# Patient Record
Sex: FEMALE | Race: WHITE | Employment: OTHER | URBAN - METROPOLITAN AREA
[De-identification: names, ages, dates, MRNs, and addresses within clinical notes are randomized per-mention and may not be internally consistent; named-entity substitution may affect disease eponyms.]

---

## 2017-11-24 ENCOUNTER — HOSPITAL ENCOUNTER (OUTPATIENT)
Dept: WOMENS IMAGING | Age: 77
Discharge: HOME | End: 2017-11-24
Payer: MEDICARE

## 2017-11-24 DIAGNOSIS — Z12.39 ENCOUNTER FOR SCREENING BREAST EXAMINATION: ICD-10-CM

## 2017-11-24 PROCEDURE — G0202 SCR MAMMO BI INCL CAD: HCPCS

## 2022-11-08 ENCOUNTER — HOSPITAL ENCOUNTER (OUTPATIENT)
Dept: WOMENS IMAGING | Age: 82
Discharge: HOME | End: 2022-11-10
Payer: MEDICARE

## 2022-11-08 VITALS — HEIGHT: 64 IN

## 2022-11-08 DIAGNOSIS — Z12.31 ENCOUNTER FOR SCREENING MAMMOGRAM FOR BREAST CANCER: ICD-10-CM

## 2022-11-08 PROCEDURE — 77067 SCR MAMMO BI INCL CAD: CPT

## 2023-03-24 LAB
ALANINE AMINOTRANSFERASE (SGPT) (U/L) IN SER/PLAS: 13 U/L (ref 7–45)
ALBUMIN (G/DL) IN SER/PLAS: 4.4 G/DL (ref 3.4–5)
ALKALINE PHOSPHATASE (U/L) IN SER/PLAS: 59 U/L (ref 33–136)
ANION GAP IN SER/PLAS: 14 MMOL/L (ref 10–20)
ASPARTATE AMINOTRANSFERASE (SGOT) (U/L) IN SER/PLAS: 21 U/L (ref 9–39)
BASOPHILS (10*3/UL) IN BLOOD BY AUTOMATED COUNT: 0.06 X10E9/L (ref 0–0.1)
BASOPHILS/100 LEUKOCYTES IN BLOOD BY AUTOMATED COUNT: 0.9 % (ref 0–2)
BILIRUBIN TOTAL (MG/DL) IN SER/PLAS: 0.6 MG/DL (ref 0–1.2)
CALCIUM (MG/DL) IN SER/PLAS: 9.9 MG/DL (ref 8.6–10.3)
CARBON DIOXIDE, TOTAL (MMOL/L) IN SER/PLAS: 30 MMOL/L (ref 21–32)
CHLORIDE (MMOL/L) IN SER/PLAS: 102 MMOL/L (ref 98–107)
CHOLESTEROL (MG/DL) IN SER/PLAS: 159 MG/DL (ref 0–199)
CHOLESTEROL IN HDL (MG/DL) IN SER/PLAS: 58.5 MG/DL
CHOLESTEROL/HDL RATIO: 2.7
CREATININE (MG/DL) IN SER/PLAS: 1.09 MG/DL (ref 0.5–1.05)
EOSINOPHILS (10*3/UL) IN BLOOD BY AUTOMATED COUNT: 0.22 X10E9/L (ref 0–0.4)
EOSINOPHILS/100 LEUKOCYTES IN BLOOD BY AUTOMATED COUNT: 3.3 % (ref 0–6)
ERYTHROCYTE DISTRIBUTION WIDTH (RATIO) BY AUTOMATED COUNT: 14.5 % (ref 11.5–14.5)
ERYTHROCYTE MEAN CORPUSCULAR HEMOGLOBIN CONCENTRATION (G/DL) BY AUTOMATED: 31.2 G/DL (ref 32–36)
ERYTHROCYTE MEAN CORPUSCULAR VOLUME (FL) BY AUTOMATED COUNT: 86 FL (ref 80–100)
ERYTHROCYTES (10*6/UL) IN BLOOD BY AUTOMATED COUNT: 5.06 X10E12/L (ref 4–5.2)
GFR FEMALE: 50 ML/MIN/1.73M2
GLUCOSE (MG/DL) IN SER/PLAS: 102 MG/DL (ref 74–99)
HEMATOCRIT (%) IN BLOOD BY AUTOMATED COUNT: 43.3 % (ref 36–46)
HEMOGLOBIN (G/DL) IN BLOOD: 13.5 G/DL (ref 12–16)
IMMATURE GRANULOCYTES/100 LEUKOCYTES IN BLOOD BY AUTOMATED COUNT: 0.1 % (ref 0–0.9)
LDL: 84 MG/DL (ref 0–99)
LEUKOCYTES (10*3/UL) IN BLOOD BY AUTOMATED COUNT: 6.7 X10E9/L (ref 4.4–11.3)
LYMPHOCYTES (10*3/UL) IN BLOOD BY AUTOMATED COUNT: 1.48 X10E9/L (ref 0.8–3)
LYMPHOCYTES/100 LEUKOCYTES IN BLOOD BY AUTOMATED COUNT: 22 % (ref 13–44)
MONOCYTES (10*3/UL) IN BLOOD BY AUTOMATED COUNT: 0.56 X10E9/L (ref 0.05–0.8)
MONOCYTES/100 LEUKOCYTES IN BLOOD BY AUTOMATED COUNT: 8.3 % (ref 2–10)
NEUTROPHILS (10*3/UL) IN BLOOD BY AUTOMATED COUNT: 4.4 X10E9/L (ref 1.6–5.5)
NEUTROPHILS/100 LEUKOCYTES IN BLOOD BY AUTOMATED COUNT: 65.4 % (ref 40–80)
PLATELETS (10*3/UL) IN BLOOD AUTOMATED COUNT: 194 X10E9/L (ref 150–450)
POTASSIUM (MMOL/L) IN SER/PLAS: 3.5 MMOL/L (ref 3.5–5.3)
PROTEIN TOTAL: 7.3 G/DL (ref 6.4–8.2)
SODIUM (MMOL/L) IN SER/PLAS: 142 MMOL/L (ref 136–145)
TRIGLYCERIDE (MG/DL) IN SER/PLAS: 83 MG/DL (ref 0–149)
UREA NITROGEN (MG/DL) IN SER/PLAS: 22 MG/DL (ref 6–23)
VLDL: 17 MG/DL (ref 0–40)

## 2023-04-01 LAB — URINE CULTURE: ABNORMAL

## 2023-05-16 LAB
BASOPHILS (10*3/UL) IN BLOOD BY AUTOMATED COUNT: 0.05 X10E9/L (ref 0–0.1)
BASOPHILS/100 LEUKOCYTES IN BLOOD BY AUTOMATED COUNT: 0.8 % (ref 0–2)
EOSINOPHILS (10*3/UL) IN BLOOD BY AUTOMATED COUNT: 0.25 X10E9/L (ref 0–0.4)
EOSINOPHILS/100 LEUKOCYTES IN BLOOD BY AUTOMATED COUNT: 3.8 % (ref 0–6)
ERYTHROCYTE DISTRIBUTION WIDTH (RATIO) BY AUTOMATED COUNT: 14.3 % (ref 11.5–14.5)
ERYTHROCYTE MEAN CORPUSCULAR HEMOGLOBIN CONCENTRATION (G/DL) BY AUTOMATED: 30.6 G/DL (ref 32–36)
ERYTHROCYTE MEAN CORPUSCULAR VOLUME (FL) BY AUTOMATED COUNT: 88 FL (ref 80–100)
ERYTHROCYTES (10*6/UL) IN BLOOD BY AUTOMATED COUNT: 4.85 X10E12/L (ref 4–5.2)
HEMATOCRIT (%) IN BLOOD BY AUTOMATED COUNT: 42.8 % (ref 36–46)
HEMOGLOBIN (G/DL) IN BLOOD: 13.1 G/DL (ref 12–16)
IMMATURE GRANULOCYTES/100 LEUKOCYTES IN BLOOD BY AUTOMATED COUNT: 0.2 % (ref 0–0.9)
LEUKOCYTES (10*3/UL) IN BLOOD BY AUTOMATED COUNT: 6.6 X10E9/L (ref 4.4–11.3)
LYMPHOCYTES (10*3/UL) IN BLOOD BY AUTOMATED COUNT: 1.46 X10E9/L (ref 0.8–3)
LYMPHOCYTES/100 LEUKOCYTES IN BLOOD BY AUTOMATED COUNT: 22.1 % (ref 13–44)
MONOCYTES (10*3/UL) IN BLOOD BY AUTOMATED COUNT: 0.49 X10E9/L (ref 0.05–0.8)
MONOCYTES/100 LEUKOCYTES IN BLOOD BY AUTOMATED COUNT: 7.4 % (ref 2–10)
NEUTROPHILS (10*3/UL) IN BLOOD BY AUTOMATED COUNT: 4.35 X10E9/L (ref 1.6–5.5)
NEUTROPHILS/100 LEUKOCYTES IN BLOOD BY AUTOMATED COUNT: 65.7 % (ref 40–80)
PLATELETS (10*3/UL) IN BLOOD AUTOMATED COUNT: 189 X10E9/L (ref 150–450)

## 2023-07-21 LAB
ALANINE AMINOTRANSFERASE (SGPT) (U/L) IN SER/PLAS: 16 U/L (ref 7–45)
ALBUMIN (G/DL) IN SER/PLAS: 4.4 G/DL (ref 3.4–5)
ALKALINE PHOSPHATASE (U/L) IN SER/PLAS: 65 U/L (ref 33–136)
ANION GAP IN SER/PLAS: 12 MMOL/L (ref 10–20)
ASPARTATE AMINOTRANSFERASE (SGOT) (U/L) IN SER/PLAS: 24 U/L (ref 9–39)
BASOPHILS (10*3/UL) IN BLOOD BY AUTOMATED COUNT: 0.03 X10E9/L (ref 0–0.1)
BASOPHILS/100 LEUKOCYTES IN BLOOD BY AUTOMATED COUNT: 0.5 % (ref 0–2)
BILIRUBIN TOTAL (MG/DL) IN SER/PLAS: 0.7 MG/DL (ref 0–1.2)
CALCIUM (MG/DL) IN SER/PLAS: 9.8 MG/DL (ref 8.6–10.3)
CARBON DIOXIDE, TOTAL (MMOL/L) IN SER/PLAS: 29 MMOL/L (ref 21–32)
CHLORIDE (MMOL/L) IN SER/PLAS: 104 MMOL/L (ref 98–107)
CHOLESTEROL (MG/DL) IN SER/PLAS: 148 MG/DL (ref 0–199)
CHOLESTEROL IN HDL (MG/DL) IN SER/PLAS: 54.5 MG/DL
CHOLESTEROL/HDL RATIO: 2.7
CREATININE (MG/DL) IN SER/PLAS: 0.98 MG/DL (ref 0.5–1.05)
EOSINOPHILS (10*3/UL) IN BLOOD BY AUTOMATED COUNT: 0.15 X10E9/L (ref 0–0.4)
EOSINOPHILS/100 LEUKOCYTES IN BLOOD BY AUTOMATED COUNT: 2.6 % (ref 0–6)
ERYTHROCYTE DISTRIBUTION WIDTH (RATIO) BY AUTOMATED COUNT: 14.8 % (ref 11.5–14.5)
ERYTHROCYTE MEAN CORPUSCULAR HEMOGLOBIN CONCENTRATION (G/DL) BY AUTOMATED: 31.6 G/DL (ref 32–36)
ERYTHROCYTE MEAN CORPUSCULAR VOLUME (FL) BY AUTOMATED COUNT: 85 FL (ref 80–100)
ERYTHROCYTES (10*6/UL) IN BLOOD BY AUTOMATED COUNT: 4.94 X10E12/L (ref 4–5.2)
GFR FEMALE: 57 ML/MIN/1.73M2
GLUCOSE (MG/DL) IN SER/PLAS: 90 MG/DL (ref 74–99)
HEMATOCRIT (%) IN BLOOD BY AUTOMATED COUNT: 42.1 % (ref 36–46)
HEMOGLOBIN (G/DL) IN BLOOD: 13.3 G/DL (ref 12–16)
IMMATURE GRANULOCYTES/100 LEUKOCYTES IN BLOOD BY AUTOMATED COUNT: 0.3 % (ref 0–0.9)
LDL: 77 MG/DL (ref 0–99)
LEUKOCYTES (10*3/UL) IN BLOOD BY AUTOMATED COUNT: 5.9 X10E9/L (ref 4.4–11.3)
LYMPHOCYTES (10*3/UL) IN BLOOD BY AUTOMATED COUNT: 1.23 X10E9/L (ref 0.8–3)
LYMPHOCYTES/100 LEUKOCYTES IN BLOOD BY AUTOMATED COUNT: 21 % (ref 13–44)
MONOCYTES (10*3/UL) IN BLOOD BY AUTOMATED COUNT: 0.35 X10E9/L (ref 0.05–0.8)
MONOCYTES/100 LEUKOCYTES IN BLOOD BY AUTOMATED COUNT: 6 % (ref 2–10)
NEUTROPHILS (10*3/UL) IN BLOOD BY AUTOMATED COUNT: 4.08 X10E9/L (ref 1.6–5.5)
NEUTROPHILS/100 LEUKOCYTES IN BLOOD BY AUTOMATED COUNT: 69.6 % (ref 40–80)
PARATHYRIN INTACT (PG/ML) IN SER/PLAS: 88.4 PG/ML (ref 18.5–88)
PHOSPHATE (MG/DL) IN SER/PLAS: 3.1 MG/DL (ref 2.5–4.9)
PLATELETS (10*3/UL) IN BLOOD AUTOMATED COUNT: 175 X10E9/L (ref 150–450)
POTASSIUM (MMOL/L) IN SER/PLAS: 3.6 MMOL/L (ref 3.5–5.3)
PROTEIN TOTAL: 7.3 G/DL (ref 6.4–8.2)
SODIUM (MMOL/L) IN SER/PLAS: 141 MMOL/L (ref 136–145)
TRIGLYCERIDE (MG/DL) IN SER/PLAS: 84 MG/DL (ref 0–149)
UREA NITROGEN (MG/DL) IN SER/PLAS: 17 MG/DL (ref 6–23)
VLDL: 17 MG/DL (ref 0–40)

## 2023-10-25 ENCOUNTER — LAB (OUTPATIENT)
Dept: LAB | Facility: LAB | Age: 83
End: 2023-10-25
Payer: MEDICARE

## 2023-10-25 DIAGNOSIS — I12.9 HYPERTENSIVE CHRONIC KIDNEY DISEASE WITH STAGE 1 THROUGH STAGE 4 CHRONIC KIDNEY DISEASE, OR UNSPECIFIED CHRONIC KIDNEY DISEASE: ICD-10-CM

## 2023-10-25 DIAGNOSIS — N18.31 CHRONIC KIDNEY DISEASE, STAGE 3A (MULTI): Primary | ICD-10-CM

## 2023-10-25 DIAGNOSIS — E78.49 OTHER HYPERLIPIDEMIA: ICD-10-CM

## 2023-10-25 DIAGNOSIS — N18.31 CHRONIC KIDNEY DISEASE, STAGE 3A (MULTI): ICD-10-CM

## 2023-10-25 LAB
ALBUMIN SERPL BCP-MCNC: 4 G/DL (ref 3.4–5)
ALP SERPL-CCNC: 76 U/L (ref 33–136)
ALT SERPL W P-5'-P-CCNC: 15 U/L (ref 7–45)
ANION GAP SERPL CALC-SCNC: 11 MMOL/L (ref 10–20)
AST SERPL W P-5'-P-CCNC: 19 U/L (ref 9–39)
BASOPHILS # BLD AUTO: 0.04 X10*3/UL (ref 0–0.1)
BASOPHILS NFR BLD AUTO: 0.5 %
BILIRUB SERPL-MCNC: 0.6 MG/DL (ref 0–1.2)
BUN SERPL-MCNC: 19 MG/DL (ref 6–23)
CALCIUM SERPL-MCNC: 9.6 MG/DL (ref 8.6–10.3)
CHLORIDE SERPL-SCNC: 108 MMOL/L (ref 98–107)
CHOLEST SERPL-MCNC: 151 MG/DL (ref 0–199)
CHOLESTEROL/HDL RATIO: 2.8
CO2 SERPL-SCNC: 29 MMOL/L (ref 21–32)
CREAT SERPL-MCNC: 0.96 MG/DL (ref 0.5–1.05)
EOSINOPHIL # BLD AUTO: 0.18 X10*3/UL (ref 0–0.4)
EOSINOPHIL NFR BLD AUTO: 2.4 %
ERYTHROCYTE [DISTWIDTH] IN BLOOD BY AUTOMATED COUNT: 14.5 % (ref 11.5–14.5)
GFR SERPL CREATININE-BSD FRML MDRD: 59 ML/MIN/1.73M*2
GLUCOSE SERPL-MCNC: 75 MG/DL (ref 74–99)
HCT VFR BLD AUTO: 40 % (ref 36–46)
HDLC SERPL-MCNC: 54.1 MG/DL
HGB BLD-MCNC: 12.5 G/DL (ref 12–16)
IMM GRANULOCYTES # BLD AUTO: 0.02 X10*3/UL (ref 0–0.5)
IMM GRANULOCYTES NFR BLD AUTO: 0.3 % (ref 0–0.9)
LDLC SERPL CALC-MCNC: 81 MG/DL
LYMPHOCYTES # BLD AUTO: 1.21 X10*3/UL (ref 0.8–3)
LYMPHOCYTES NFR BLD AUTO: 16.2 %
MCH RBC QN AUTO: 27.2 PG (ref 26–34)
MCHC RBC AUTO-ENTMCNC: 31.3 G/DL (ref 32–36)
MCV RBC AUTO: 87 FL (ref 80–100)
MONOCYTES # BLD AUTO: 0.5 X10*3/UL (ref 0.05–0.8)
MONOCYTES NFR BLD AUTO: 6.7 %
NEUTROPHILS # BLD AUTO: 5.51 X10*3/UL (ref 1.6–5.5)
NEUTROPHILS NFR BLD AUTO: 73.9 %
NON HDL CHOLESTEROL: 97 MG/DL (ref 0–149)
NRBC BLD-RTO: 0 /100 WBCS (ref 0–0)
PHOSPHATE SERPL-MCNC: 3.6 MG/DL (ref 2.5–4.9)
PLATELET # BLD AUTO: 176 X10*3/UL (ref 150–450)
PMV BLD AUTO: 11 FL (ref 7.5–11.5)
POTASSIUM SERPL-SCNC: 3.9 MMOL/L (ref 3.5–5.3)
PROT SERPL-MCNC: 6.5 G/DL (ref 6.4–8.2)
PTH-INTACT SERPL-MCNC: 86.5 PG/ML (ref 18.5–88)
RBC # BLD AUTO: 4.59 X10*6/UL (ref 4–5.2)
SODIUM SERPL-SCNC: 144 MMOL/L (ref 136–145)
TRIGL SERPL-MCNC: 80 MG/DL (ref 0–149)
VLDL: 16 MG/DL (ref 0–40)
WBC # BLD AUTO: 7.5 X10*3/UL (ref 4.4–11.3)

## 2023-10-25 PROCEDURE — 80061 LIPID PANEL: CPT

## 2023-10-25 PROCEDURE — 85025 COMPLETE CBC W/AUTO DIFF WBC: CPT

## 2023-10-25 PROCEDURE — 83970 ASSAY OF PARATHORMONE: CPT

## 2023-10-25 PROCEDURE — 80053 COMPREHEN METABOLIC PANEL: CPT

## 2023-10-25 PROCEDURE — 36415 COLL VENOUS BLD VENIPUNCTURE: CPT

## 2023-10-25 PROCEDURE — 84100 ASSAY OF PHOSPHORUS: CPT

## 2024-03-01 ENCOUNTER — LAB (OUTPATIENT)
Dept: LAB | Facility: LAB | Age: 84
End: 2024-03-01
Payer: MEDICARE

## 2024-03-01 DIAGNOSIS — N18.31 CHRONIC KIDNEY DISEASE, STAGE 3A (MULTI): Primary | ICD-10-CM

## 2024-03-01 DIAGNOSIS — I12.9 HYPERTENSIVE CHRONIC KIDNEY DISEASE WITH STAGE 1 THROUGH STAGE 4 CHRONIC KIDNEY DISEASE, OR UNSPECIFIED CHRONIC KIDNEY DISEASE: ICD-10-CM

## 2024-03-01 DIAGNOSIS — E78.49 OTHER HYPERLIPIDEMIA: ICD-10-CM

## 2024-03-01 LAB
ALBUMIN SERPL BCP-MCNC: 4.2 G/DL (ref 3.4–5)
ALP SERPL-CCNC: 86 U/L (ref 33–136)
ALT SERPL W P-5'-P-CCNC: 13 U/L (ref 7–45)
ANION GAP SERPL CALC-SCNC: 13 MMOL/L (ref 10–20)
AST SERPL W P-5'-P-CCNC: 20 U/L (ref 9–39)
BASOPHILS # BLD AUTO: 0.03 X10*3/UL (ref 0–0.1)
BASOPHILS NFR BLD AUTO: 0.5 %
BILIRUB SERPL-MCNC: 0.7 MG/DL (ref 0–1.2)
BUN SERPL-MCNC: 20 MG/DL (ref 6–23)
CALCIUM SERPL-MCNC: 9.8 MG/DL (ref 8.6–10.3)
CHLORIDE SERPL-SCNC: 106 MMOL/L (ref 98–107)
CHOLEST SERPL-MCNC: 147 MG/DL (ref 0–199)
CHOLESTEROL/HDL RATIO: 2.9
CO2 SERPL-SCNC: 27 MMOL/L (ref 21–32)
CREAT SERPL-MCNC: 1.04 MG/DL (ref 0.5–1.05)
EGFRCR SERPLBLD CKD-EPI 2021: 53 ML/MIN/1.73M*2
EOSINOPHIL # BLD AUTO: 0.17 X10*3/UL (ref 0–0.4)
EOSINOPHIL NFR BLD AUTO: 2.8 %
ERYTHROCYTE [DISTWIDTH] IN BLOOD BY AUTOMATED COUNT: 14.6 % (ref 11.5–14.5)
GLUCOSE SERPL-MCNC: 93 MG/DL (ref 74–99)
HCT VFR BLD AUTO: 42.3 % (ref 36–46)
HDLC SERPL-MCNC: 50.8 MG/DL
HGB BLD-MCNC: 13.5 G/DL (ref 12–16)
IMM GRANULOCYTES # BLD AUTO: 0.01 X10*3/UL (ref 0–0.5)
IMM GRANULOCYTES NFR BLD AUTO: 0.2 % (ref 0–0.9)
LDLC SERPL CALC-MCNC: 80 MG/DL
LYMPHOCYTES # BLD AUTO: 1.31 X10*3/UL (ref 0.8–3)
LYMPHOCYTES NFR BLD AUTO: 21.2 %
MCH RBC QN AUTO: 27.3 PG (ref 26–34)
MCHC RBC AUTO-ENTMCNC: 31.9 G/DL (ref 32–36)
MCV RBC AUTO: 86 FL (ref 80–100)
MONOCYTES # BLD AUTO: 0.42 X10*3/UL (ref 0.05–0.8)
MONOCYTES NFR BLD AUTO: 6.8 %
NEUTROPHILS # BLD AUTO: 4.23 X10*3/UL (ref 1.6–5.5)
NEUTROPHILS NFR BLD AUTO: 68.5 %
NON HDL CHOLESTEROL: 96 MG/DL (ref 0–149)
NRBC BLD-RTO: 0 /100 WBCS (ref 0–0)
PHOSPHATE SERPL-MCNC: 3.4 MG/DL (ref 2.5–4.9)
PLATELET # BLD AUTO: 173 X10*3/UL (ref 150–450)
POTASSIUM SERPL-SCNC: 4 MMOL/L (ref 3.5–5.3)
PROT SERPL-MCNC: 7.1 G/DL (ref 6.4–8.2)
PTH-INTACT SERPL-MCNC: 97.4 PG/ML (ref 18.5–88)
RBC # BLD AUTO: 4.94 X10*6/UL (ref 4–5.2)
SODIUM SERPL-SCNC: 142 MMOL/L (ref 136–145)
TRIGL SERPL-MCNC: 82 MG/DL (ref 0–149)
VLDL: 16 MG/DL (ref 0–40)
WBC # BLD AUTO: 6.2 X10*3/UL (ref 4.4–11.3)

## 2024-03-01 PROCEDURE — 80053 COMPREHEN METABOLIC PANEL: CPT

## 2024-03-01 PROCEDURE — 83970 ASSAY OF PARATHORMONE: CPT

## 2024-03-01 PROCEDURE — 80061 LIPID PANEL: CPT

## 2024-03-01 PROCEDURE — 36415 COLL VENOUS BLD VENIPUNCTURE: CPT

## 2024-03-01 PROCEDURE — 84100 ASSAY OF PHOSPHORUS: CPT

## 2024-03-01 PROCEDURE — 85025 COMPLETE CBC W/AUTO DIFF WBC: CPT

## 2024-03-11 PROCEDURE — 87086 URINE CULTURE/COLONY COUNT: CPT

## 2024-03-11 PROCEDURE — 87186 SC STD MICRODIL/AGAR DIL: CPT

## 2024-03-12 ENCOUNTER — LAB REQUISITION (OUTPATIENT)
Dept: LAB | Facility: HOSPITAL | Age: 84
End: 2024-03-12
Payer: COMMERCIAL

## 2024-03-12 DIAGNOSIS — R82.998 OTHER ABNORMAL FINDINGS IN URINE: ICD-10-CM

## 2024-03-14 LAB — BACTERIA UR CULT: ABNORMAL

## 2024-04-05 ENCOUNTER — APPOINTMENT (OUTPATIENT)
Dept: RADIOLOGY | Facility: HOSPITAL | Age: 84
End: 2024-04-05
Payer: MEDICARE

## 2024-04-05 ENCOUNTER — HOSPITAL ENCOUNTER (EMERGENCY)
Facility: HOSPITAL | Age: 84
Discharge: OTHER INSTITUTION | End: 2024-04-06
Attending: STUDENT IN AN ORGANIZED HEALTH CARE EDUCATION/TRAINING PROGRAM
Payer: MEDICARE

## 2024-04-05 DIAGNOSIS — R42 LIGHT HEADED: Primary | ICD-10-CM

## 2024-04-05 DIAGNOSIS — R07.9 CHEST PAIN, UNSPECIFIED TYPE: ICD-10-CM

## 2024-04-05 LAB — GLUCOSE BLD MANUAL STRIP-MCNC: 166 MG/DL (ref 74–99)

## 2024-04-05 PROCEDURE — 83735 ASSAY OF MAGNESIUM: CPT | Performed by: STUDENT IN AN ORGANIZED HEALTH CARE EDUCATION/TRAINING PROGRAM

## 2024-04-05 PROCEDURE — 99291 CRITICAL CARE FIRST HOUR: CPT

## 2024-04-05 PROCEDURE — 93005 ELECTROCARDIOGRAM TRACING: CPT

## 2024-04-05 PROCEDURE — 2500000004 HC RX 250 GENERAL PHARMACY W/ HCPCS (ALT 636 FOR OP/ED)

## 2024-04-05 PROCEDURE — 70450 CT HEAD/BRAIN W/O DYE: CPT | Mod: 59

## 2024-04-05 PROCEDURE — 70498 CT ANGIOGRAPHY NECK: CPT

## 2024-04-05 PROCEDURE — 85025 COMPLETE CBC W/AUTO DIFF WBC: CPT | Performed by: STUDENT IN AN ORGANIZED HEALTH CARE EDUCATION/TRAINING PROGRAM

## 2024-04-05 PROCEDURE — 84484 ASSAY OF TROPONIN QUANT: CPT | Performed by: STUDENT IN AN ORGANIZED HEALTH CARE EDUCATION/TRAINING PROGRAM

## 2024-04-05 PROCEDURE — 80053 COMPREHEN METABOLIC PANEL: CPT | Performed by: STUDENT IN AN ORGANIZED HEALTH CARE EDUCATION/TRAINING PROGRAM

## 2024-04-05 PROCEDURE — 85610 PROTHROMBIN TIME: CPT | Performed by: STUDENT IN AN ORGANIZED HEALTH CARE EDUCATION/TRAINING PROGRAM

## 2024-04-05 PROCEDURE — 99291 CRITICAL CARE FIRST HOUR: CPT | Performed by: STUDENT IN AN ORGANIZED HEALTH CARE EDUCATION/TRAINING PROGRAM

## 2024-04-05 PROCEDURE — 85730 THROMBOPLASTIN TIME PARTIAL: CPT | Performed by: STUDENT IN AN ORGANIZED HEALTH CARE EDUCATION/TRAINING PROGRAM

## 2024-04-05 PROCEDURE — 96374 THER/PROPH/DIAG INJ IV PUSH: CPT

## 2024-04-05 PROCEDURE — 83880 ASSAY OF NATRIURETIC PEPTIDE: CPT | Performed by: STUDENT IN AN ORGANIZED HEALTH CARE EDUCATION/TRAINING PROGRAM

## 2024-04-05 PROCEDURE — 71045 X-RAY EXAM CHEST 1 VIEW: CPT

## 2024-04-05 PROCEDURE — 82947 ASSAY GLUCOSE BLOOD QUANT: CPT | Mod: 59

## 2024-04-05 PROCEDURE — 36415 COLL VENOUS BLD VENIPUNCTURE: CPT | Performed by: STUDENT IN AN ORGANIZED HEALTH CARE EDUCATION/TRAINING PROGRAM

## 2024-04-05 RX ORDER — ONDANSETRON HYDROCHLORIDE 2 MG/ML
4 INJECTION, SOLUTION INTRAVENOUS ONCE
Status: COMPLETED | OUTPATIENT
Start: 2024-04-05 | End: 2024-04-05

## 2024-04-05 RX ORDER — ONDANSETRON HYDROCHLORIDE 2 MG/ML
INJECTION, SOLUTION INTRAVENOUS
Status: COMPLETED
Start: 2024-04-05 | End: 2024-04-05

## 2024-04-05 RX ADMIN — ONDANSETRON HYDROCHLORIDE 4 MG: 2 INJECTION, SOLUTION INTRAVENOUS at 23:50

## 2024-04-05 RX ADMIN — ONDANSETRON 4 MG: 2 INJECTION INTRAMUSCULAR; INTRAVENOUS at 23:50

## 2024-04-05 ASSESSMENT — COLUMBIA-SUICIDE SEVERITY RATING SCALE - C-SSRS
1. IN THE PAST MONTH, HAVE YOU WISHED YOU WERE DEAD OR WISHED YOU COULD GO TO SLEEP AND NOT WAKE UP?: NO
6. HAVE YOU EVER DONE ANYTHING, STARTED TO DO ANYTHING, OR PREPARED TO DO ANYTHING TO END YOUR LIFE?: NO
2. HAVE YOU ACTUALLY HAD ANY THOUGHTS OF KILLING YOURSELF?: NO

## 2024-04-06 ENCOUNTER — APPOINTMENT (OUTPATIENT)
Dept: RADIOLOGY | Facility: HOSPITAL | Age: 84
End: 2024-04-06
Payer: MEDICARE

## 2024-04-06 ENCOUNTER — APPOINTMENT (OUTPATIENT)
Dept: CARDIOLOGY | Facility: HOSPITAL | Age: 84
DRG: 300 | End: 2024-04-06
Payer: MEDICARE

## 2024-04-06 ENCOUNTER — APPOINTMENT (OUTPATIENT)
Dept: RADIOLOGY | Facility: HOSPITAL | Age: 84
DRG: 300 | End: 2024-04-06
Payer: MEDICARE

## 2024-04-06 ENCOUNTER — HOSPITAL ENCOUNTER (INPATIENT)
Facility: HOSPITAL | Age: 84
LOS: 4 days | Discharge: HOME | DRG: 300 | End: 2024-04-10
Attending: THORACIC SURGERY (CARDIOTHORACIC VASCULAR SURGERY) | Admitting: INTERNAL MEDICINE
Payer: MEDICARE

## 2024-04-06 ENCOUNTER — APPOINTMENT (OUTPATIENT)
Dept: CARDIOLOGY | Facility: HOSPITAL | Age: 84
End: 2024-04-06
Payer: MEDICARE

## 2024-04-06 VITALS
HEIGHT: 64 IN | SYSTOLIC BLOOD PRESSURE: 137 MMHG | RESPIRATION RATE: 18 BRPM | OXYGEN SATURATION: 97 % | WEIGHT: 140 LBS | BODY MASS INDEX: 23.9 KG/M2 | DIASTOLIC BLOOD PRESSURE: 62 MMHG | TEMPERATURE: 96.6 F | HEART RATE: 60 BPM

## 2024-04-06 DIAGNOSIS — I71.00 AORTIC DISSECTION (MULTI): Primary | ICD-10-CM

## 2024-04-06 LAB
ABO GROUP (TYPE) IN BLOOD: NORMAL
ALBUMIN SERPL BCP-MCNC: 4.1 G/DL (ref 3.4–5)
ALBUMIN SERPL BCP-MCNC: 4.2 G/DL (ref 3.4–5)
ALP SERPL-CCNC: 104 U/L (ref 33–136)
ALP SERPL-CCNC: 96 U/L (ref 33–136)
ALT SERPL W P-5'-P-CCNC: 13 U/L (ref 7–45)
ALT SERPL W P-5'-P-CCNC: 14 U/L (ref 7–45)
ANION GAP BLDA CALCULATED.4IONS-SCNC: 10 MMO/L (ref 10–25)
ANION GAP SERPL CALC-SCNC: 16 MMOL/L (ref 10–20)
ANION GAP SERPL CALC-SCNC: 16 MMOL/L (ref 10–20)
ANTIBODY SCREEN: NORMAL
AORTIC VALVE MEAN GRADIENT: 5 MMHG
AORTIC VALVE PEAK VELOCITY: 1.46 M/S
APPEARANCE UR: CLEAR
APTT PPP: 27 SECONDS (ref 27–38)
APTT PPP: 27 SECONDS (ref 27–38)
AST SERPL W P-5'-P-CCNC: 19 U/L (ref 9–39)
AST SERPL W P-5'-P-CCNC: 20 U/L (ref 9–39)
ATRIAL RATE: 72 BPM
ATRIAL RATE: 86 BPM
AV PEAK GRADIENT: 8.5 MMHG
AVA (PEAK VEL): 2.14 CM2
AVA (VTI): 2.17 CM2
BASE EXCESS BLDA CALC-SCNC: -0.9 MMOL/L (ref -2–3)
BASOPHILS # BLD AUTO: 0.03 X10*3/UL (ref 0–0.1)
BASOPHILS NFR BLD AUTO: 0.4 %
BILIRUB SERPL-MCNC: 0.6 MG/DL (ref 0–1.2)
BILIRUB SERPL-MCNC: 0.6 MG/DL (ref 0–1.2)
BILIRUB UR STRIP.AUTO-MCNC: NEGATIVE MG/DL
BNP SERPL-MCNC: 115 PG/ML (ref 0–99)
BNP SERPL-MCNC: 63 PG/ML (ref 0–99)
BODY SURFACE AREA: 1.69 M2
BODY TEMPERATURE: 37 DEGREES CELSIUS
BUN SERPL-MCNC: 18 MG/DL (ref 6–23)
BUN SERPL-MCNC: 22 MG/DL (ref 6–23)
CA-I BLD-SCNC: 1.11 MMOL/L (ref 1.1–1.33)
CA-I BLDA-SCNC: 1.17 MMOL/L (ref 1.1–1.33)
CALCIUM SERPL-MCNC: 9 MG/DL (ref 8.6–10.6)
CALCIUM SERPL-MCNC: 9.5 MG/DL (ref 8.6–10.3)
CARDIAC TROPONIN I PNL SERPL HS: 4 NG/L (ref 0–13)
CARDIAC TROPONIN I PNL SERPL HS: 6 NG/L (ref 0–34)
CARDIAC TROPONIN I PNL SERPL HS: 8 NG/L (ref 0–13)
CHLORIDE BLDA-SCNC: 109 MMOL/L (ref 98–107)
CHLORIDE SERPL-SCNC: 105 MMOL/L (ref 98–107)
CHLORIDE SERPL-SCNC: 106 MMOL/L (ref 98–107)
CO2 SERPL-SCNC: 20 MMOL/L (ref 21–32)
CO2 SERPL-SCNC: 21 MMOL/L (ref 21–32)
COLOR UR: ABNORMAL
CREAT SERPL-MCNC: 0.88 MG/DL (ref 0.5–1.05)
CREAT SERPL-MCNC: 1.01 MG/DL (ref 0.5–1.05)
EGFRCR SERPLBLD CKD-EPI 2021: 55 ML/MIN/1.73M*2
EGFRCR SERPLBLD CKD-EPI 2021: 65 ML/MIN/1.73M*2
EJECTION FRACTION APICAL 4 CHAMBER: 73.9
EOSINOPHIL # BLD AUTO: 0.13 X10*3/UL (ref 0–0.4)
EOSINOPHIL NFR BLD AUTO: 1.9 %
ERYTHROCYTE [DISTWIDTH] IN BLOOD BY AUTOMATED COUNT: 14.3 % (ref 11.5–14.5)
ERYTHROCYTE [DISTWIDTH] IN BLOOD BY AUTOMATED COUNT: 14.3 % (ref 11.5–14.5)
FLUAV RNA RESP QL NAA+PROBE: NOT DETECTED
FLUBV RNA RESP QL NAA+PROBE: NOT DETECTED
GLUCOSE BLDA-MCNC: 126 MG/DL (ref 74–99)
GLUCOSE SERPL-MCNC: 161 MG/DL (ref 74–99)
GLUCOSE SERPL-MCNC: 172 MG/DL (ref 74–99)
GLUCOSE UR STRIP.AUTO-MCNC: NEGATIVE MG/DL
HCO3 BLDA-SCNC: 23.5 MMOL/L (ref 22–26)
HCT VFR BLD AUTO: 38.8 % (ref 36–46)
HCT VFR BLD AUTO: 39.3 % (ref 36–46)
HCT VFR BLD EST: 34 % (ref 36–46)
HGB BLD-MCNC: 12.4 G/DL (ref 12–16)
HGB BLD-MCNC: 13.1 G/DL (ref 12–16)
HGB BLDA-MCNC: 11.4 G/DL (ref 12–16)
HOLD SPECIMEN: NORMAL
IMM GRANULOCYTES # BLD AUTO: 0.01 X10*3/UL (ref 0–0.5)
IMM GRANULOCYTES NFR BLD AUTO: 0.1 % (ref 0–0.9)
INHALED O2 CONCENTRATION: 21 %
INR PPP: 1 (ref 0.9–1.1)
INR PPP: 1 (ref 0.9–1.1)
KETONES UR STRIP.AUTO-MCNC: ABNORMAL MG/DL
LACTATE BLDA-SCNC: 0.6 MMOL/L (ref 0.4–2)
LACTATE SERPL-SCNC: 0.8 MMOL/L (ref 0.4–2)
LEFT ATRIUM VOLUME AREA LENGTH INDEX BSA: 24.7 ML/M2
LEFT VENTRICLE INTERNAL DIMENSION DIASTOLE: 4 CM (ref 3.5–6)
LEFT VENTRICULAR OUTFLOW TRACT DIAMETER: 2 CM
LEUKOCYTE ESTERASE UR QL STRIP.AUTO: NEGATIVE
LIPASE SERPL-CCNC: 25 U/L (ref 9–82)
LV EJECTION FRACTION BIPLANE: 71 %
LYMPHOCYTES # BLD AUTO: 1.66 X10*3/UL (ref 0.8–3)
LYMPHOCYTES NFR BLD AUTO: 24.2 %
MAGNESIUM SERPL-MCNC: 1.69 MG/DL (ref 1.6–2.4)
MAGNESIUM SERPL-MCNC: 1.72 MG/DL (ref 1.6–2.4)
MCH RBC QN AUTO: 27.3 PG (ref 26–34)
MCH RBC QN AUTO: 27.6 PG (ref 26–34)
MCHC RBC AUTO-ENTMCNC: 32 G/DL (ref 32–36)
MCHC RBC AUTO-ENTMCNC: 33.3 G/DL (ref 32–36)
MCV RBC AUTO: 83 FL (ref 80–100)
MCV RBC AUTO: 85 FL (ref 80–100)
MITRAL VALVE E/A RATIO: 0.7
MITRAL VALVE E/E' RATIO: 9.86
MONOCYTES # BLD AUTO: 0.42 X10*3/UL (ref 0.05–0.8)
MONOCYTES NFR BLD AUTO: 6.1 %
NEUTROPHILS # BLD AUTO: 4.61 X10*3/UL (ref 1.6–5.5)
NEUTROPHILS NFR BLD AUTO: 67.3 %
NITRITE UR QL STRIP.AUTO: NEGATIVE
NRBC BLD-RTO: 0 /100 WBCS (ref 0–0)
NRBC BLD-RTO: 0 /100 WBCS (ref 0–0)
OXYHGB MFR BLDA: 91.7 % (ref 94–98)
P AXIS: 50 DEGREES
P AXIS: 59 DEGREES
P OFFSET: 187 MS
P OFFSET: 198 MS
P ONSET: 131 MS
P ONSET: 145 MS
PCO2 BLDA: 37 MM HG (ref 38–42)
PH BLDA: 7.41 PH (ref 7.38–7.42)
PH UR STRIP.AUTO: 7 [PH]
PLATELET # BLD AUTO: 181 X10*3/UL (ref 150–450)
PLATELET # BLD AUTO: 184 X10*3/UL (ref 150–450)
PO2 BLDA: 66 MM HG (ref 85–95)
POTASSIUM BLDA-SCNC: 4.2 MMOL/L (ref 3.5–5.3)
POTASSIUM SERPL-SCNC: 3.5 MMOL/L (ref 3.5–5.3)
POTASSIUM SERPL-SCNC: 3.9 MMOL/L (ref 3.5–5.3)
PR INTERVAL: 162 MS
PR INTERVAL: 178 MS
PROT SERPL-MCNC: 6.7 G/DL (ref 6.4–8.2)
PROT SERPL-MCNC: 7 G/DL (ref 6.4–8.2)
PROT UR STRIP.AUTO-MCNC: NEGATIVE MG/DL
PROTHROMBIN TIME: 11.4 SECONDS (ref 9.8–12.8)
PROTHROMBIN TIME: 11.4 SECONDS (ref 9.8–12.8)
Q ONSET: 220 MS
Q ONSET: 226 MS
QRS COUNT: 12 BEATS
QRS COUNT: 14 BEATS
QRS DURATION: 76 MS
QRS DURATION: 86 MS
QT INTERVAL: 418 MS
QT INTERVAL: 480 MS
QTC CALCULATION(BAZETT): 500 MS
QTC CALCULATION(BAZETT): 525 MS
QTC FREDERICIA: 471 MS
QTC FREDERICIA: 510 MS
R AXIS: 20 DEGREES
R AXIS: 25 DEGREES
RBC # BLD AUTO: 4.55 X10*6/UL (ref 4–5.2)
RBC # BLD AUTO: 4.75 X10*6/UL (ref 4–5.2)
RBC # UR STRIP.AUTO: ABNORMAL /UL
RBC #/AREA URNS AUTO: NORMAL /HPF
RH FACTOR (ANTIGEN D): NORMAL
RIGHT VENTRICLE FREE WALL PEAK S': 11.7 CM/S
RIGHT VENTRICLE PEAK SYSTOLIC PRESSURE: 23.6 MMHG
SAO2 % BLDA: 95 % (ref 94–100)
SARS-COV-2 RNA RESP QL NAA+PROBE: NOT DETECTED
SODIUM BLDA-SCNC: 138 MMOL/L (ref 136–145)
SODIUM SERPL-SCNC: 137 MMOL/L (ref 136–145)
SODIUM SERPL-SCNC: 139 MMOL/L (ref 136–145)
SP GR UR STRIP.AUTO: 1.03
T AXIS: 21 DEGREES
T AXIS: 42 DEGREES
T OFFSET: 435 MS
T OFFSET: 460 MS
TRICUSPID ANNULAR PLANE SYSTOLIC EXCURSION: 2.3 CM
UROBILINOGEN UR STRIP.AUTO-MCNC: <2 MG/DL
VENTRICULAR RATE: 72 BPM
VENTRICULAR RATE: 86 BPM
WBC # BLD AUTO: 6.9 X10*3/UL (ref 4.4–11.3)
WBC # BLD AUTO: 8.6 X10*3/UL (ref 4.4–11.3)
WBC #/AREA URNS AUTO: NORMAL /HPF

## 2024-04-06 PROCEDURE — 2500000001 HC RX 250 WO HCPCS SELF ADMINISTERED DRUGS (ALT 637 FOR MEDICARE OP)

## 2024-04-06 PROCEDURE — 2500000004 HC RX 250 GENERAL PHARMACY W/ HCPCS (ALT 636 FOR OP/ED)

## 2024-04-06 PROCEDURE — 93005 ELECTROCARDIOGRAM TRACING: CPT

## 2024-04-06 PROCEDURE — 86901 BLOOD TYPING SEROLOGIC RH(D): CPT

## 2024-04-06 PROCEDURE — 1100000001 HC PRIVATE ROOM DAILY

## 2024-04-06 PROCEDURE — 70496 CT ANGIOGRAPHY HEAD: CPT | Performed by: RADIOLOGY

## 2024-04-06 PROCEDURE — 83735 ASSAY OF MAGNESIUM: CPT

## 2024-04-06 PROCEDURE — 96375 TX/PRO/DX INJ NEW DRUG ADDON: CPT

## 2024-04-06 PROCEDURE — 87636 SARSCOV2 & INF A&B AMP PRB: CPT | Performed by: STUDENT IN AN ORGANIZED HEALTH CARE EDUCATION/TRAINING PROGRAM

## 2024-04-06 PROCEDURE — 85610 PROTHROMBIN TIME: CPT

## 2024-04-06 PROCEDURE — 71045 X-RAY EXAM CHEST 1 VIEW: CPT | Performed by: RADIOLOGY

## 2024-04-06 PROCEDURE — 37799 UNLISTED PX VASCULAR SURGERY: CPT

## 2024-04-06 PROCEDURE — 2550000001 HC RX 255 CONTRASTS: Performed by: STUDENT IN AN ORGANIZED HEALTH CARE EDUCATION/TRAINING PROGRAM

## 2024-04-06 PROCEDURE — 85027 COMPLETE CBC AUTOMATED: CPT

## 2024-04-06 PROCEDURE — 99291 CRITICAL CARE FIRST HOUR: CPT

## 2024-04-06 PROCEDURE — 83690 ASSAY OF LIPASE: CPT | Performed by: STUDENT IN AN ORGANIZED HEALTH CARE EDUCATION/TRAINING PROGRAM

## 2024-04-06 PROCEDURE — 36415 COLL VENOUS BLD VENIPUNCTURE: CPT | Performed by: STUDENT IN AN ORGANIZED HEALTH CARE EDUCATION/TRAINING PROGRAM

## 2024-04-06 PROCEDURE — 93306 TTE W/DOPPLER COMPLETE: CPT

## 2024-04-06 PROCEDURE — 84484 ASSAY OF TROPONIN QUANT: CPT

## 2024-04-06 PROCEDURE — 80053 COMPREHEN METABOLIC PANEL: CPT

## 2024-04-06 PROCEDURE — 93306 TTE W/DOPPLER COMPLETE: CPT | Performed by: INTERNAL MEDICINE

## 2024-04-06 PROCEDURE — 36415 COLL VENOUS BLD VENIPUNCTURE: CPT

## 2024-04-06 PROCEDURE — 83605 ASSAY OF LACTIC ACID: CPT

## 2024-04-06 PROCEDURE — 2500000004 HC RX 250 GENERAL PHARMACY W/ HCPCS (ALT 636 FOR OP/ED): Mod: JZ

## 2024-04-06 PROCEDURE — 81001 URINALYSIS AUTO W/SCOPE: CPT | Performed by: STUDENT IN AN ORGANIZED HEALTH CARE EDUCATION/TRAINING PROGRAM

## 2024-04-06 PROCEDURE — 84132 ASSAY OF SERUM POTASSIUM: CPT

## 2024-04-06 PROCEDURE — 84484 ASSAY OF TROPONIN QUANT: CPT | Performed by: STUDENT IN AN ORGANIZED HEALTH CARE EDUCATION/TRAINING PROGRAM

## 2024-04-06 PROCEDURE — 74174 CTA ABD&PLVS W/CONTRAST: CPT | Performed by: RADIOLOGY

## 2024-04-06 PROCEDURE — 71275 CT ANGIOGRAPHY CHEST: CPT | Performed by: RADIOLOGY

## 2024-04-06 PROCEDURE — 71045 X-RAY EXAM CHEST 1 VIEW: CPT

## 2024-04-06 PROCEDURE — 71275 CT ANGIOGRAPHY CHEST: CPT

## 2024-04-06 PROCEDURE — 2500000004 HC RX 250 GENERAL PHARMACY W/ HCPCS (ALT 636 FOR OP/ED): Performed by: STUDENT IN AN ORGANIZED HEALTH CARE EDUCATION/TRAINING PROGRAM

## 2024-04-06 PROCEDURE — 82330 ASSAY OF CALCIUM: CPT

## 2024-04-06 PROCEDURE — 70498 CT ANGIOGRAPHY NECK: CPT | Performed by: RADIOLOGY

## 2024-04-06 PROCEDURE — 70450 CT HEAD/BRAIN W/O DYE: CPT | Performed by: RADIOLOGY

## 2024-04-06 PROCEDURE — 83880 ASSAY OF NATRIURETIC PEPTIDE: CPT

## 2024-04-06 RX ORDER — NICARDIPINE HYDROCHLORIDE 0.2 MG/ML
2.5-15 INJECTION INTRAVENOUS CONTINUOUS
Status: DISCONTINUED | OUTPATIENT
Start: 2024-04-06 | End: 2024-04-08

## 2024-04-06 RX ORDER — ENOXAPARIN SODIUM 100 MG/ML
40 INJECTION SUBCUTANEOUS EVERY 24 HOURS
Status: DISCONTINUED | OUTPATIENT
Start: 2024-04-06 | End: 2024-04-06

## 2024-04-06 RX ORDER — ESMOLOL HYDROCHLORIDE 10 MG/ML
50-300 INJECTION, SOLUTION INTRAVENOUS CONTINUOUS
Status: DISCONTINUED | OUTPATIENT
Start: 2024-04-06 | End: 2024-04-08

## 2024-04-06 RX ORDER — MAGNESIUM SULFATE HEPTAHYDRATE 40 MG/ML
2 INJECTION, SOLUTION INTRAVENOUS ONCE
Status: COMPLETED | OUTPATIENT
Start: 2024-04-06 | End: 2024-04-06

## 2024-04-06 RX ORDER — ACETAMINOPHEN 325 MG/1
975 TABLET ORAL ONCE
Status: DISCONTINUED | OUTPATIENT
Start: 2024-04-06 | End: 2024-04-06 | Stop reason: HOSPADM

## 2024-04-06 RX ORDER — ESMOLOL HYDROCHLORIDE 10 MG/ML
INJECTION, SOLUTION INTRAVENOUS
Status: COMPLETED
Start: 2024-04-06 | End: 2024-04-06

## 2024-04-06 RX ORDER — HEPARIN SODIUM 5000 [USP'U]/ML
5000 INJECTION, SOLUTION INTRAVENOUS; SUBCUTANEOUS EVERY 12 HOURS
Status: DISCONTINUED | OUTPATIENT
Start: 2024-04-06 | End: 2024-04-08

## 2024-04-06 RX ORDER — ESMOLOL HYDROCHLORIDE 10 MG/ML
50-300 INJECTION, SOLUTION INTRAVENOUS CONTINUOUS
Status: DISCONTINUED | OUTPATIENT
Start: 2024-04-06 | End: 2024-04-06 | Stop reason: HOSPADM

## 2024-04-06 RX ORDER — NICARDIPINE HYDROCHLORIDE 0.2 MG/ML
INJECTION INTRAVENOUS
Status: COMPLETED
Start: 2024-04-06 | End: 2024-04-06

## 2024-04-06 RX ORDER — ATORVASTATIN CALCIUM 80 MG/1
80 TABLET, FILM COATED ORAL NIGHTLY
Status: DISCONTINUED | OUTPATIENT
Start: 2024-04-06 | End: 2024-04-10 | Stop reason: HOSPADM

## 2024-04-06 RX ORDER — POLYETHYLENE GLYCOL 3350 17 G/17G
17 POWDER, FOR SOLUTION ORAL DAILY
Status: DISCONTINUED | OUTPATIENT
Start: 2024-04-06 | End: 2024-04-10 | Stop reason: HOSPADM

## 2024-04-06 RX ORDER — DILTIAZEM HCL/D5W 125 MG/125
5-15 PLASTIC BAG, INJECTION (ML) INTRAVENOUS CONTINUOUS
Status: DISCONTINUED | OUTPATIENT
Start: 2024-04-06 | End: 2024-04-06

## 2024-04-06 RX ORDER — ESMOLOL HYDROCHLORIDE 10 MG/ML
50-300 INJECTION, SOLUTION INTRAVENOUS CONTINUOUS
Status: DISCONTINUED | OUTPATIENT
Start: 2024-04-06 | End: 2024-04-06

## 2024-04-06 RX ORDER — METOCLOPRAMIDE HYDROCHLORIDE 5 MG/ML
10 INJECTION INTRAMUSCULAR; INTRAVENOUS ONCE
Status: COMPLETED | OUTPATIENT
Start: 2024-04-06 | End: 2024-04-06

## 2024-04-06 RX ADMIN — ATORVASTATIN CALCIUM 80 MG: 80 TABLET, FILM COATED ORAL at 19:44

## 2024-04-06 RX ADMIN — ESMOLOL HYDROCHLORIDE 100 MCG/KG/MIN: 10 INJECTION, SOLUTION INTRAVENOUS at 14:49

## 2024-04-06 RX ADMIN — HEPARIN SODIUM 5000 UNITS: 5000 INJECTION INTRAVENOUS; SUBCUTANEOUS at 14:28

## 2024-04-06 RX ADMIN — ESMOLOL HYDROCHLORIDE 100 MCG/KG/MIN: 10 INJECTION, SOLUTION INTRAVENOUS at 20:43

## 2024-04-06 RX ADMIN — NICARDIPINE HYDROCHLORIDE 5 MG/HR: 0.2 INJECTION, SOLUTION INTRAVENOUS at 05:45

## 2024-04-06 RX ADMIN — ESMOLOL HYDROCHLORIDE 130 MCG/KG/MIN: 10 INJECTION INTRAVENOUS at 05:30

## 2024-04-06 RX ADMIN — NICARDIPINE HYDROCHLORIDE 7.5 MG/HR: 0.2 INJECTION, SOLUTION INTRAVENOUS at 15:34

## 2024-04-06 RX ADMIN — IOHEXOL 100 ML: 350 INJECTION, SOLUTION INTRAVENOUS at 00:22

## 2024-04-06 RX ADMIN — ESMOLOL HYDROCHLORIDE 50 MCG/KG/MIN: 10 INJECTION INTRAVENOUS at 02:14

## 2024-04-06 RX ADMIN — MAGNESIUM SULFATE HEPTAHYDRATE 2 G: 40 INJECTION, SOLUTION INTRAVENOUS at 11:04

## 2024-04-06 RX ADMIN — POLYETHYLENE GLYCOL 3350 17 G: 17 POWDER, FOR SOLUTION ORAL at 15:38

## 2024-04-06 RX ADMIN — NICARDIPINE HYDROCHLORIDE 5 MG/HR: 0.2 INJECTION, SOLUTION INTRAVENOUS at 22:27

## 2024-04-06 RX ADMIN — ESMOLOL HYDROCHLORIDE 130 MCG/KG/MIN: 10 INJECTION, SOLUTION INTRAVENOUS at 05:30

## 2024-04-06 RX ADMIN — METOCLOPRAMIDE HYDROCHLORIDE 10 MG: 5 INJECTION INTRAMUSCULAR; INTRAVENOUS at 01:07

## 2024-04-06 RX ADMIN — IOHEXOL 75 ML: 350 INJECTION, SOLUTION INTRAVENOUS at 00:10

## 2024-04-06 RX ADMIN — ESMOLOL HYDROCHLORIDE 100 MCG/KG/MIN: 10 INJECTION, SOLUTION INTRAVENOUS at 08:25

## 2024-04-06 SDOH — SOCIAL STABILITY: SOCIAL INSECURITY: ARE THERE ANY APPARENT SIGNS OF INJURIES/BEHAVIORS THAT COULD BE RELATED TO ABUSE/NEGLECT?: NO

## 2024-04-06 SDOH — SOCIAL STABILITY: SOCIAL INSECURITY: WERE YOU ABLE TO COMPLETE ALL THE BEHAVIORAL HEALTH SCREENINGS?: YES

## 2024-04-06 SDOH — SOCIAL STABILITY: SOCIAL INSECURITY: ABUSE: ADULT

## 2024-04-06 SDOH — SOCIAL STABILITY: SOCIAL INSECURITY: DO YOU FEEL ANYONE HAS EXPLOITED OR TAKEN ADVANTAGE OF YOU FINANCIALLY OR OF YOUR PERSONAL PROPERTY?: NO

## 2024-04-06 SDOH — SOCIAL STABILITY: SOCIAL INSECURITY: DOES ANYONE TRY TO KEEP YOU FROM HAVING/CONTACTING OTHER FRIENDS OR DOING THINGS OUTSIDE YOUR HOME?: NO

## 2024-04-06 SDOH — SOCIAL STABILITY: SOCIAL INSECURITY: DO YOU FEEL UNSAFE GOING BACK TO THE PLACE WHERE YOU ARE LIVING?: NO

## 2024-04-06 SDOH — SOCIAL STABILITY: SOCIAL INSECURITY: ARE YOU OR HAVE YOU BEEN THREATENED OR ABUSED PHYSICALLY, EMOTIONALLY, OR SEXUALLY BY ANYONE?: NO

## 2024-04-06 SDOH — SOCIAL STABILITY: SOCIAL INSECURITY: HAVE YOU HAD THOUGHTS OF HARMING ANYONE ELSE?: NO

## 2024-04-06 SDOH — SOCIAL STABILITY: SOCIAL INSECURITY: HAS ANYONE EVER THREATENED TO HURT YOUR FAMILY OR YOUR PETS?: NO

## 2024-04-06 ASSESSMENT — ACTIVITIES OF DAILY LIVING (ADL)
ADEQUATE_TO_COMPLETE_ADL: YES
FEEDING YOURSELF: INDEPENDENT
HEARING - RIGHT EAR: DIFFICULTY WITH NOISE
LACK_OF_TRANSPORTATION: NO
LACK_OF_TRANSPORTATION: NO
GROOMING: INDEPENDENT
BATHING: INDEPENDENT
PATIENT'S MEMORY ADEQUATE TO SAFELY COMPLETE DAILY ACTIVITIES?: YES
HEARING - LEFT EAR: DIFFICULTY WITH NOISE
WALKS IN HOME: INDEPENDENT
DRESSING YOURSELF: INDEPENDENT
JUDGMENT_ADEQUATE_SAFELY_COMPLETE_DAILY_ACTIVITIES: YES
TOILETING: INDEPENDENT

## 2024-04-06 ASSESSMENT — LIFESTYLE VARIABLES
HOW MANY STANDARD DRINKS CONTAINING ALCOHOL DO YOU HAVE ON A TYPICAL DAY: 1 OR 2
EVER FELT BAD OR GUILTY ABOUT YOUR DRINKING: NO
AUDIT-C TOTAL SCORE: 1
HAVE YOU EVER FELT YOU SHOULD CUT DOWN ON YOUR DRINKING: NO
HAVE PEOPLE ANNOYED YOU BY CRITICIZING YOUR DRINKING: NO
HOW OFTEN DO YOU HAVE 6 OR MORE DRINKS ON ONE OCCASION: NEVER
TOTAL SCORE: 0
SUBSTANCE_ABUSE_PAST_12_MONTHS: NO
PRESCIPTION_ABUSE_PAST_12_MONTHS: NO
EVER HAD A DRINK FIRST THING IN THE MORNING TO STEADY YOUR NERVES TO GET RID OF A HANGOVER: NO
HOW OFTEN DO YOU HAVE A DRINK CONTAINING ALCOHOL: MONTHLY OR LESS
AUDIT-C TOTAL SCORE: 1
SKIP TO QUESTIONS 9-10: 1

## 2024-04-06 ASSESSMENT — PAIN SCALES - GENERAL
PAINLEVEL_OUTOF10: 5 - MODERATE PAIN
PAINLEVEL_OUTOF10: 0 - NO PAIN
PAINLEVEL_OUTOF10: 5 - MODERATE PAIN

## 2024-04-06 ASSESSMENT — COLUMBIA-SUICIDE SEVERITY RATING SCALE - C-SSRS
2. HAVE YOU ACTUALLY HAD ANY THOUGHTS OF KILLING YOURSELF?: NO
6. HAVE YOU EVER DONE ANYTHING, STARTED TO DO ANYTHING, OR PREPARED TO DO ANYTHING TO END YOUR LIFE?: NO
1. IN THE PAST MONTH, HAVE YOU WISHED YOU WERE DEAD OR WISHED YOU COULD GO TO SLEEP AND NOT WAKE UP?: NO

## 2024-04-06 ASSESSMENT — PATIENT HEALTH QUESTIONNAIRE - PHQ9
SUM OF ALL RESPONSES TO PHQ9 QUESTIONS 1 & 2: 2
1. LITTLE INTEREST OR PLEASURE IN DOING THINGS: SEVERAL DAYS
2. FEELING DOWN, DEPRESSED OR HOPELESS: SEVERAL DAYS

## 2024-04-06 ASSESSMENT — COGNITIVE AND FUNCTIONAL STATUS - GENERAL
PATIENT BASELINE BEDBOUND: NO
DAILY ACTIVITIY SCORE: 24
MOBILITY SCORE: 24

## 2024-04-06 ASSESSMENT — PAIN DESCRIPTION - PROGRESSION: CLINICAL_PROGRESSION: NOT CHANGED

## 2024-04-06 ASSESSMENT — PAIN DESCRIPTION - PAIN TYPE: TYPE: ACUTE PAIN

## 2024-04-06 ASSESSMENT — PAIN - FUNCTIONAL ASSESSMENT
PAIN_FUNCTIONAL_ASSESSMENT: 0-10

## 2024-04-06 ASSESSMENT — PAIN DESCRIPTION - LOCATION: LOCATION: HEAD

## 2024-04-06 NOTE — CONSULTS
CARDIAC SURGERY CONSULT NOTE    Reason For Consult  Rule out Type A aortic dissection    History Of Present Illness  Yonas Solano is a 84 y.o. female with a relatively benign PMH of HTN, HLD, and osteoarthritis who presents as a transfer for concern of aortic dissection.     According to the patient, she started to experience 6/10 chest and back pain. The pain was located in her retrosternal area as well as the middle of her upper back. There were no precipitating symptoms. She had associated lightheadedness which she had never experienced before. This prompted her to seek medical attention.    At the OSH, it is unclear if she was hypertensive. Ultimately given CT findings, she was transferred to [facility] for escalation of care. At the time of my evaluation, her symptoms have resolved.     Of note, she denies any smoking history. She does not have connective tissue disorder. There is history of a ?brother who had an expected death of unknown etiology at the age of 69. No other history of aortopathy.      Past Medical History  HTN, HLD, osteoarthritis    Surgical History  She has no past surgical history on file.     Social History  She reports that she has never smoked. She has been exposed to tobacco smoke. She has never used smokeless tobacco. Alcohol use questions deferred to the physician. She reports that she does not use drugs.    Family History  No family history on file.     Allergies  Codeine and Penicillins    Review of Systems  Negative except of the aforementioned     Physical Exam  General: NAD   Neuro: No focal deficits. Aox4  HENT: Atraumatic / normocephalic; supple neck; no carotid bruit; Trachea midline  Chest: Symmetric chest rise. No crepitus. No obvious deformity  Lungs: CTAB  CV: S1 and S2 are audible. No MRG. Palpable peripheral pulses  Abdomen: Soft, NT / ND. No masses.   Inguinal: Palpable femoral pulses  Extremities: WWP  Skin: Moist. No rash     Last Recorded Vitals  Blood pressure 98/50,  "pulse 61, temperature 36.4 °C (97.5 °F), temperature source Temporal, resp. rate 16, height 1.626 m (5' 4.02\"), weight 63.5 kg (139 lb 15.9 oz), SpO2 94 %.    Relevant Results  CTA shows a focal area of dissection along the aortic arch. No evidence of ascending aortic involvement. No evidence of arch vessels involvement     Assessment/Plan   Yonas Solano is a 84 y.o. female with a relatively benign PMH of HTN, HLD, and osteoarthritis who presents as a transfer for concern of aortic dissection. She is currently clinically stable. I have seen the patient and reviewed the images. I also discussed them with Dr. Méndez (attending on call). I do not believe there is any concern for Type A aortic dissection at this time. Meanwhile, I do not believe that the CT findings are artifact either. I do believe that she has a focal type B dissection (versus ulcer or atheromatous plaque) which warrants anti-impulse therapy as has been initiated by the ICU.     From our standpoint, the CT can be repeated at the 48 hr malathi. Would continue to obtain daily labs and monitor for any evidence of malperfusion. Continue anti-impulse therapy. Ok for clear liquid diet at this point. Consider vascular surgery consult for management of Type B dissection. Should the patient decompensate, please call cardiac surgery immediately.    Romeo Schwartz MD  Cardiac Surgery Fellow  PGY-6  Pager: [phone]  "

## 2024-04-06 NOTE — H&P
History Of Present Illness  Yonas Solano is a 84 y.o. female with a past medical history of hypertension and hyperlipidemia who was transferred from an outside hospital for concerns of descending artery dissection/type B aortic dissection.    Patient said she was about to use the restroom yesterday at about 9 PM when she had chest pain and accompanying dizziness.  Chest pain was deep, about 6/10, located in the epigastric region, constant, did not radiate, first episode no relieving or aggravating factors.  Patient also said she had nausea and vomiting at the time of the chest pain, about 5 episodes contained recently ingested food, no blood in vomitus.  There was no associated diaphoresis, fever, orthopnea, dyspnea, bendopnea, leg swelling, loss of consciousness palpitations, changes in bowel or urinary habits or leg swelling.  No history of recent travel, sick contacts or use of any herbal medications.    Patient was rushed to the ED in an outside hospital where EKGs did not show signs of ischemia and her troponins are negative x 2. BNP is not consistent with CHF exacerbation. CMP and CBC are grossly unremarkable. CT angio chest/abd/pelv showed: Linear filling defect in the inferior aspect of the aortic arch, suspicious for focal nonocclusive dissection. She was then started on IV esmolol and IV nicardipine and transferred to [facility] for further management.    In the [facility], patient arrived with systolic in the early 100, conscious and alert, stable, no obvious distress.  CBC, CMP, trops, BNP, lactate, coags and chest x-ray was gotten.  CT surgery involved inpatient acceptance and would like to repeat CT angio this morning to evaluate progression of disease before intervention.  A-line was placed and patient to acutely monitor blood pressure for impulse control.    Of note patient denies alcohol abuse, smoking or illicit drug use.    Past Medical History  No past medical history on file.    Surgical  History  No past surgical history on file.     Social History  She has no history on file for tobacco use, alcohol use, and drug use.    Family History  History of abdominal aneurysm in an older brother.     Allergies  Codeine and Penicillins    Review of Systems  12 point review of system is noncontributory     Physical Exam  General: No obvious distress  HEENT: No JVD  Chest: Clear to auscultate bilaterally.  CVS: S1-S2 within normal limits  Abdomen: Nontender  MSK: No leg swelling  Skin: No changes  Neuro: AAOx4     Last Recorded Vitals  Blood pressure 101/67, pulse 77, resp. rate 19, SpO2 97 %.    Assessment and plan   Yonas Solano is a 84 y.o. female with a past medical history of hypertension and hyperlipidemia who was transferred from an outside hospital for concerns of descending artery dissection/type B aortic dissection. EKGs did not show signs of ischemia and her troponins are negative x 2. BNP is not consistent with CHF exacerbation. CMP and CBC are grossly unremarkable. CT angio chest/abd/pelv showed: Linear filling defect in the inferior aspect of the aortic arch, suspicious for focal nonocclusive dissection. She was then started on IV esmolol and IV nicardipine and transferred to [facility] for further management.  Will continue to monitor vital signs for heart rate less than 70 and systolic blood pressure less than 110 as per impulse control.  Repeat CT angio chest abdomen and pelvis this morning to evaluate progression of disease and engage CT surgery for further recs.    #Type B aortic dissection  #Focal nonocclusive dissection  :::ADD-RS: 1 point  -Continue Iv esmolol  -Continue Iv cardene  -Medical mgt is the mainstay; but extending beyond the renal/iliac arteries may warrant surgery  -Also, retrograde extension above the aortic arch may warrant surgery  -Pending labs  -Impulse control: HR<70, SBP<110  -CT angio chest/abd/pelv in the am  -Pending CT surgery recs  - Npo for  now    #HTN  #HLD  - continue Iv esmolol  - continue Iv cardene  - takes a statin at home and an antihypertensive(5mg), most likely amlodipine but she cannot remember  - No urgent need for both meds now, but can start low dose statin as needed    F as needed  E as needed  N npo  A subcute lovenox  CODE STATUS: full code  NOK: [phone]    Garfield Jasso MD

## 2024-04-06 NOTE — PROCEDURES
Procedure: LEFT radial arterial line  Indication: Aortic dissection  Time Out: Time out performed  Consent: Written and/or verbal consent obtained in chart    Performed by: Lawrence Sage MD    The patient was prepped and draped in the usual sterile manner using chlorhexidine scrub. 1% lidocaine was used to numb the region. The LEFT radial artery was palpated and successfully cannulated on the first pass. Pulsatile, arterial blood was visualized and the artery was then threaded using the Seldinger technique and a catheter was then sutured into place. Good wave-form was obtained. The patient tolerated the procedure well without any immediate complications. The area was cleaned and Tegaderm was applied     Complications: No complications, pt tolerated procedure well  Blood Loss: Minimal

## 2024-04-06 NOTE — ED TRIAGE NOTES
From home via EMS for c/o CP w vomiting. Possible syncope while using the bathroom. Baby ASA x4, NTG sl X1, and Zofran 4mg given en route

## 2024-04-06 NOTE — CARE PLAN
The patient's goals for the shift include      The clinical goals for the shift include patient will remain HR & BP stable throughout shift    Over the shift, the patient did not make progress toward the following goals. Barriers to progression include increased HR with ambulation. Recommendations to address these barriers include titrating cardene and esmolol drips.

## 2024-04-06 NOTE — ED PROVIDER NOTES
HPI   Chief Complaint   Patient presents with    Chest Pain    Dizziness       Patient is an 84-year-old female with history of hypertension hyperlipidemia who presents for multiple medical plaints.  Patient states she began feeling lightheadedness around 930 this evening.  States she had chest pain in her left chest as well as some shortness of breath.  States she may have had a feverish feeling.  And was feeling nauseous.  No cough, Raynaud's, sore throat, diarrhea, abdominal pain.  No coagulation use.  No history of cardiac stenting, MI, CVA, DVT or PE.                          No data recorded                   Patient History   No past medical history on file.  No past surgical history on file.  No family history on file.  Social History     Tobacco Use    Smoking status: Not on file    Smokeless tobacco: Not on file   Substance Use Topics    Alcohol use: Not on file    Drug use: Not on file       Physical Exam   ED Triage Vitals   Temp Pulse Resp BP   -- -- -- --      SpO2 Temp src Heart Rate Source Patient Position   -- -- -- --      BP Location FiO2 (%)     -- --       Physical Exam  Constitutional:       General: She is not in acute distress.  HENT:      Head: Normocephalic.   Eyes:      Extraocular Movements: Extraocular movements intact.      Conjunctiva/sclera: Conjunctivae normal.      Pupils: Pupils are equal, round, and reactive to light.   Cardiovascular:      Rate and Rhythm: Normal rate and regular rhythm.      Pulses: Normal pulses.           Radial pulses are 2+ on the right side and 2+ on the left side.        Dorsalis pedis pulses are 2+ on the right side and 2+ on the left side.      Heart sounds: Normal heart sounds.   Pulmonary:      Effort: Pulmonary effort is normal.      Breath sounds: Normal breath sounds.   Abdominal:      General: There is no distension.      Palpations: Abdomen is soft. There is no mass.      Tenderness: There is no abdominal tenderness. There is no guarding.    Musculoskeletal:         General: No deformity.      Cervical back: Normal range of motion and neck supple.      Right lower leg: No edema.      Left lower leg: No edema.   Skin:     General: Skin is warm and dry.      Findings: No lesion or rash.   Neurological:      General: No focal deficit present.      Mental Status: She is alert and oriented to person, place, and time. Mental status is at baseline.      Cranial Nerves: No cranial nerve deficit.      Sensory: No sensory deficit.      Motor: No weakness.      Comments: NIH on arrival 2, bilateral lower extremity drift.  Unable to complete exam prior to CT scan.  NIH after CT scan 0.  Van negative before and after CT scan.   Psychiatric:         Mood and Affect: Mood normal.         ED Course & MDM   Diagnoses as of 04/06/24 0153   Light headed   Chest pain, unspecified type       Medical Decision Making  EKG on my interpretation: Rate 86, rhythm irregular, axis normal, , QRS 76, QTc 500, T waves: Unremarkable, ST segments: No elevations or depressions, dictation: Sinus rhythm with PACs, no STEMI    EKG on my interpretation: Rate 72, rhythm irregular, axis normal, , QRS 86, QTc 525, T waves: Unremarkable, ST segments: No elevations or depressions, dictation: Sinus rhythm with PACs, prolonged QT, no STEMI      Patient is an 84-year-old female with above-stated past medical history who presents for multiple medical complaints.  On arrival, I was concerned for possible stroke given the patient's symptoms, stroke was activated.  Patient was transported to CT scan prior to completion of my initial NIH evaluation, however given her description of her symptoms there was concern for possible posterior stroke.  Therefore, CT angios were ordered.  Given her chest pain was also concern for possible dissection of the aorta and a CT angio of the chest abdomen pelvis was ordered.  EKGs did not show signs of ischemia and her troponins are negative x 2, low  suspicion for ACS.  BNP is not consistent with CHF exacerbation.  CMP and CBC are grossly unremarkable.  Influenza and COVID are negative.  Patient later endorsed urinary tract infection symptoms, however urinalysis is negative.  Chest x-ray did not show signs of acute findings.  CT scan of the head was negative for acute findings, CT angio of the head and neck was negative. I discussed the patient's CT scan results and concern for possible stroke with  of [facility] neurology and he agreed that TNK was not indicated given the risks versus benefits, patient's improving symptoms and the likelihood of an alternative cause. CT angio of the chest abdomen pelvis showed a focal nonocclusive dissection in the inferior aspect of the aortic arch.  Given there is no alert from radiology I was unclear if this was a incidental finding, therefore I contacted the transfer center by phone and requested the dissection team to review the case.  They were reviewing the images.  I did recontact the transfer center again and spoke with Dr. Velez and the CICU fellow who agreed that the patient to be treated as a acute aortic dissection.  I ordered esmolol with heart rate and SBP parameters confirmed by the fellow.  This was started in the emergency department.  Patient was life flighted to [facility] for further management.    Disclaimer: This note was dictated using speech recognition software. Minor errors in transcription may be present. Please call if questions.     Sidney Dolan MD  [facility] Emergency Medicine  Contact on [employer]          Problems Addressed:  Chest pain, unspecified type: acute illness or injury  Light headed: acute illness or injury    Amount and/or Complexity of Data Reviewed  Labs: ordered.  Radiology: ordered.  ECG/medicine tests: ordered and independent interpretation performed.        Procedure  Critical Care    Performed by: Sidney Dolan MD  Authorized by: Sidney Dolan MD    Critical care  provider statement:     Critical care time (minutes):  45    Critical care time was exclusive of:  Separately billable procedures and treating other patients    Critical care was necessary to treat or prevent imminent or life-threatening deterioration of the following conditions: Aortic Dissection.    Critical care was time spent personally by me on the following activities:  Development of treatment plan with patient or surrogate, discussions with consultants, evaluation of patient's response to treatment, examination of patient, obtaining history from patient or surrogate, ordering and performing treatments and interventions, ordering and review of laboratory studies, ordering and review of radiographic studies, pulse oximetry and re-evaluation of patient's condition    Care discussed with: accepting provider at another facility         Sidney Dolan MD  04/06/24 0217       Sidney Dolan MD  04/06/24 7845

## 2024-04-06 NOTE — PROGRESS NOTES
"Yonas Solano is a 84 y.o. female on day 0 of admission presenting with Aortic dissection (CMS/HCC).    Subjective   Pt admitted overnight. Upon evaluation this AM, pt states she is feeling well.     Patient denies any lightheadedness, dizziness, shortness of breath, chest pain, lower extremity pain/swelling           Objective   Weight: 63.5 kg (139 lb 15.9 oz) (04/06/24 0421)    Daily Weight  04/06/24 : 63.5 kg (139 lb 15.9 oz)      Last Recorded Vitals  Heart Rate:  []   Temp:  [35.9 °C (96.6 °F)]   Resp:  [13-20]   BP: ()/(47-76)   Height:  [162.6 cm (5' 4\")-162.6 cm (5' 4.02\")]   Weight:  [63.5 kg (139 lb 15.9 oz)-63.5 kg (140 lb)]   SpO2:  [91 %-100 %]          4/6/2024     4:30 AM 4/6/2024     4:45 AM 4/6/2024     5:00 AM 4/6/2024     6:00 AM 4/6/2024     7:00 AM 4/6/2024     7:15 AM 4/6/2024     7:27 AM   Vitals   Systolic 106 105 102 98 99 97    Diastolic 47 49 53 51 52 47    Heart Rate 58 56 56 53 59 53 54   Resp 17 17 17 18 20 13 22     Intake/Output last 3 Shifts:  I/O last 3 completed shifts:  In: 56.9 (0.9 mL/kg) [I.V.:56.9 (0.9 mL/kg)]  Out: 150 (2.4 mL/kg) [Urine:150 (0.1 mL/kg/hr)]  Weight: 63.5 kg       Intake/Output Summary (Last 24 hours) at 4/6/2024 0731  Last data filed at 4/6/2024 0639  Gross per 24 hour   Intake 56.93 ml   Output 150 ml   Net -93.07 ml     Relevant Results  Results from last 7 days   Lab Units 04/06/24  0343 04/05/24  2350   WBC AUTO x10*3/uL 8.6 6.9   HEMOGLOBIN g/dL 12.4 13.1   HEMATOCRIT % 38.8 39.3   PLATELETS AUTO x10*3/uL 181 184     Results from last 7 days   Lab Units 04/06/24  0343 04/05/24  2350   SODIUM mmol/L 139 137   POTASSIUM mmol/L 3.9 3.5   CO2 mmol/L 21 20*   ANION GAP mmol/L 16 16   BUN mg/dL 18 22   CREATININE mg/dL 0.88 1.01   GLUCOSE mg/dL 161* 172*   EGFR mL/min/1.73m*2 65 55*   MAGNESIUM mg/dL  --  1.69      Results from last 7 days   Lab Units 04/06/24  0343 04/05/24  2350   ALT U/L 14 13   AST U/L 19 20   ALK PHOS U/L 104 96    "   Results from last 7 days   Lab Units 04/06/24  0346 04/05/24  2350   INR  1.0 1.0             Results from last 7 days   Lab Units 04/06/24  0346   LACTATE mmol/L 0.8     Results from last 7 days   Lab Units 04/06/24  0114   LIPASE U/L 25           Physical Exam  Constitutional: No acute distress, resting comfortably in bed, cooperative  HEENT: Normocephalic, atraumatic, PERRLA, EOMI, moist mucous membranes, no pharyngeal erythema  Cardiovascular: RRR, normal S1/S2, no murmurs noted  Pulmonary: Clear to auscultation b/l, no wheezes/crackles/rhonchi, no increased work of breathing, no supplemental oxygen  GI: Soft, non-tender, non-distended, normoactive bowel sounds  Lower extremities: Warm and well perfused, no lower extremity edema  Neuro: A&O x3, able to move all 4 extremities spontaneously,   Psych: Appropriate mood and affect     Medications  Scheduled:   enoxaparin, 40 mg, subcutaneous, q24h  esmolol, 500 mcg/kg, intravenous, Once      Continuous:   esmolol,  mcg/kg/min, Last Rate: 100 mcg/kg/min (04/06/24 0639)  niCARdipine, 2.5-15 mg/hr, Last Rate: 5 mg/hr (04/06/24 0545)    PRN:             Assessment/Plan   Principal Problem:    Aortic dissection (CMS/HCC)    Yonas Solano is a 84 y.o. female with a past medical history of hypertension and hyperlipidemia who was transferred from an outside hospital for type B aortic dissection.     NEURO  LILIANA    CVS  #Type B aortic dissection  #Focal nonocclusive dissection  #HTN  :: ADD-RS: 1 point  [ ] Impulse control, HR <60, SBP <120   - IV esmolol  - IV cardene         - begin transition to oral anti-hypertensives tomorrow   - Home meds unknown but likely amlodipine (pt states dose is 5 mg but does not know name of med)   [ ] Repeat CTA CAP after 48 hours  [ ] CT surgery and Vascular surgery following        #DLD  [ ] Atorvastatin 80 mg daily     PULM  LILIANA    GI  LILIANA     RENAL  LILIANA    HEME  LILIANA    ID  LILIANA     F: as needed  E: as needed  N: Regular   A:  subcutaneous heparin q12  Code Status: FULL CODE  NOK: Aisha Gardinertiffany (daughter) [phone]

## 2024-04-06 NOTE — CONSULTS
"Reason For Consult  Type B Aortic dissection    History Of Present Illness  Yonas Solano is a 84 y.o. female with PMHx of HTN and HLD presenting from 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 Patient initially presented to the ED with chest pain and dizziness. EKG unremarkable, troponin WNL. CT angio CAP showed a linear filling defect at the inferior aortic arch suspicious for dissection. She was started on esmolol and nicaridipine and transferred to [facility] for further management. Since arrival to [facility] patient has been HDS.     Patient reports that chest pain and dizziness have completely resolved and she feels \"much better\". She denies prior vascular issues. She has never smoked, denies alcohol use, no other drugs. Son-in-law is a cardiologist at [place]. Has a good support system, granddaughter lives with her and daughter lives across the street.      Past Medical History  She has no past medical history on file.    Surgical History  She has no past surgical history on file.     Social History  She reports that she has never smoked. She has been exposed to tobacco smoke. She has never used smokeless tobacco. Alcohol use questions deferred to the physician. She reports that she does not use drugs.    Family History  No family history on file.     Allergies  Codeine and Penicillins    Review of Systems  ROS negative except for what is stated in HPI     Physical Exam  Physical Exam  Constitutional:       General: She is not in acute distress.     Appearance: She is not toxic-appearing.   HENT:      Head: Normocephalic and atraumatic.      Mouth/Throat:      Mouth: Mucous membranes are moist.   Eyes:      Conjunctiva/sclera: Conjunctivae normal.   Cardiovascular:      Rate and Rhythm: Normal rate.      Comments: On esmolol and nicardipine, MAPs 79-80  Pulmonary:      Effort: Pulmonary effort is normal.      Comments: RA  Abdominal:      General: There is no distension.      Palpations: Abdomen is soft.     " " Tenderness: There is no abdominal tenderness.   Musculoskeletal:         General: Normal range of motion.      Comments: BL UE and LE strength intact. BL radial and femoral pulses palpable.    Skin:     General: Skin is warm and dry.   Neurological:      General: No focal deficit present.      Mental Status: She is alert.   Psychiatric:         Mood and Affect: Mood normal.         Behavior: Behavior normal.       Last Recorded Vitals  Blood pressure 98/50, pulse 61, temperature 36.4 °C (97.5 °F), temperature source Temporal, resp. rate 16, height 1.626 m (5' 4.02\"), weight 63.5 kg (139 lb 15.9 oz), SpO2 94 %.    Relevant Results  Results for orders placed or performed during the hospital encounter of 04/06/24 (from the past 24 hour(s))   CBC   Result Value Ref Range    WBC 8.6 4.4 - 11.3 x10*3/uL    nRBC 0.0 0.0 - 0.0 /100 WBCs    RBC 4.55 4.00 - 5.20 x10*6/uL    Hemoglobin 12.4 12.0 - 16.0 g/dL    Hematocrit 38.8 36.0 - 46.0 %    MCV 85 80 - 100 fL    MCH 27.3 26.0 - 34.0 pg    MCHC 32.0 32.0 - 36.0 g/dL    RDW 14.3 11.5 - 14.5 %    Platelets 181 150 - 450 x10*3/uL   Comprehensive metabolic panel   Result Value Ref Range    Glucose 161 (H) 74 - 99 mg/dL    Sodium 139 136 - 145 mmol/L    Potassium 3.9 3.5 - 5.3 mmol/L    Chloride 106 98 - 107 mmol/L    Bicarbonate 21 21 - 32 mmol/L    Anion Gap 16 10 - 20 mmol/L    Urea Nitrogen 18 6 - 23 mg/dL    Creatinine 0.88 0.50 - 1.05 mg/dL    eGFR 65 >60 mL/min/1.73m*2    Calcium 9.0 8.6 - 10.6 mg/dL    Albumin 4.1 3.4 - 5.0 g/dL    Alkaline Phosphatase 104 33 - 136 U/L    Total Protein 6.7 6.4 - 8.2 g/dL    AST 19 9 - 39 U/L    Bilirubin, Total 0.6 0.0 - 1.2 mg/dL    ALT 14 7 - 45 U/L   B-type natriuretic peptide   Result Value Ref Range     (H) 0 - 99 pg/mL   Troponin I, High Sensitivity   Result Value Ref Range    Troponin I, High Sensitivity 6 0 - 34 ng/L   Coagulation Screen   Result Value Ref Range    Protime 11.4 9.8 - 12.8 seconds    INR 1.0 0.9 - 1.1    " aPTT 27 27 - 38 seconds   Lactate   Result Value Ref Range    Lactate 0.8 0.4 - 2.0 mmol/L   Calcium, ionized   Result Value Ref Range    POCT Calcium, Ionized 1.11 1.1 - 1.33 mmol/L   Magnesium   Result Value Ref Range    Magnesium 1.72 1.60 - 2.40 mg/dL   Blood Gas Arterial Full Panel   Result Value Ref Range    POCT pH, Arterial 7.41 7.38 - 7.42 pH    POCT pCO2, Arterial 37 (L) 38 - 42 mm Hg    POCT pO2, Arterial 66 (L) 85 - 95 mm Hg    POCT SO2, Arterial 95 94 - 100 %    POCT Oxy Hemoglobin, Arterial 91.7 (L) 94.0 - 98.0 %    POCT Hematocrit Calculated, Arterial 34.0 (L) 36.0 - 46.0 %    POCT Sodium, Arterial 138 136 - 145 mmol/L    POCT Potassium, Arterial 4.2 3.5 - 5.3 mmol/L    POCT Chloride, Arterial 109 (H) 98 - 107 mmol/L    POCT Ionized Calcium, Arterial 1.17 1.10 - 1.33 mmol/L    POCT Glucose, Arterial 126 (H) 74 - 99 mg/dL    POCT Lactate, Arterial 0.6 0.4 - 2.0 mmol/L    POCT Base Excess, Arterial -0.9 -2.0 - 3.0 mmol/L    POCT HCO3 Calculated, Arterial 23.5 22.0 - 26.0 mmol/L    POCT Hemoglobin, Arterial 11.4 (L) 12.0 - 16.0 g/dL    POCT Anion Gap, Arterial 10 10 - 25 mmo/L    Patient Temperature 37.0 degrees Celsius    FiO2 21 %     CT Angio CAP 4/6:  IMPRESSION:  Linear filling defect in the inferior aspect of the aortic arch, suspicious for focal nonocclusive dissection.     Assessment/Plan     Yonas Solano is a 84 y.o. 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 Patient now asymptomatic on impulse control with nicardipine and esmolol. Imaging reviewed and agree likely focal type B dissection.    - No surgical intervention at this time  - Continue impulse control with medical management BP <120 HR<70  - Agree with gated CT angio CAP in 48 hours    Patient seen with senior resident Dr. Burns and discussed with attending Dr. Woodson.    Shiela Jama MD  Vascular surgery [id number]

## 2024-04-07 LAB
ALBUMIN SERPL BCP-MCNC: 3.5 G/DL (ref 3.4–5)
ANION GAP SERPL CALC-SCNC: 13 MMOL/L (ref 10–20)
BUN SERPL-MCNC: 22 MG/DL (ref 6–23)
CALCIUM SERPL-MCNC: 8.9 MG/DL (ref 8.6–10.6)
CHLORIDE SERPL-SCNC: 110 MMOL/L (ref 98–107)
CO2 SERPL-SCNC: 22 MMOL/L (ref 21–32)
CREAT SERPL-MCNC: 1.17 MG/DL (ref 0.5–1.05)
EGFRCR SERPLBLD CKD-EPI 2021: 46 ML/MIN/1.73M*2
ERYTHROCYTE [DISTWIDTH] IN BLOOD BY AUTOMATED COUNT: 14.5 % (ref 11.5–14.5)
GLUCOSE SERPL-MCNC: 137 MG/DL (ref 74–99)
HCT VFR BLD AUTO: 34.4 % (ref 36–46)
HGB BLD-MCNC: 11.5 G/DL (ref 12–16)
LACTATE SERPL-SCNC: 0.9 MMOL/L (ref 0.4–2)
MAGNESIUM SERPL-MCNC: 2.26 MG/DL (ref 1.6–2.4)
MCH RBC QN AUTO: 27.1 PG (ref 26–34)
MCHC RBC AUTO-ENTMCNC: 33.4 G/DL (ref 32–36)
MCV RBC AUTO: 81 FL (ref 80–100)
NRBC BLD-RTO: 0 /100 WBCS (ref 0–0)
PHOSPHATE SERPL-MCNC: 3.2 MG/DL (ref 2.5–4.9)
PLATELET # BLD AUTO: 193 X10*3/UL (ref 150–450)
POTASSIUM SERPL-SCNC: 4.2 MMOL/L (ref 3.5–5.3)
RBC # BLD AUTO: 4.24 X10*6/UL (ref 4–5.2)
SODIUM SERPL-SCNC: 141 MMOL/L (ref 136–145)
WBC # BLD AUTO: 8.7 X10*3/UL (ref 4.4–11.3)

## 2024-04-07 PROCEDURE — 80069 RENAL FUNCTION PANEL: CPT

## 2024-04-07 PROCEDURE — 2500000001 HC RX 250 WO HCPCS SELF ADMINISTERED DRUGS (ALT 637 FOR MEDICARE OP): Performed by: STUDENT IN AN ORGANIZED HEALTH CARE EDUCATION/TRAINING PROGRAM

## 2024-04-07 PROCEDURE — 2500000001 HC RX 250 WO HCPCS SELF ADMINISTERED DRUGS (ALT 637 FOR MEDICARE OP)

## 2024-04-07 PROCEDURE — 2500000004 HC RX 250 GENERAL PHARMACY W/ HCPCS (ALT 636 FOR OP/ED)

## 2024-04-07 PROCEDURE — 99291 CRITICAL CARE FIRST HOUR: CPT

## 2024-04-07 PROCEDURE — 85027 COMPLETE CBC AUTOMATED: CPT

## 2024-04-07 PROCEDURE — 37799 UNLISTED PX VASCULAR SURGERY: CPT

## 2024-04-07 PROCEDURE — 83735 ASSAY OF MAGNESIUM: CPT

## 2024-04-07 PROCEDURE — 83605 ASSAY OF LACTIC ACID: CPT

## 2024-04-07 PROCEDURE — 1100000001 HC PRIVATE ROOM DAILY

## 2024-04-07 RX ORDER — AMLODIPINE BESYLATE 5 MG/1
5 TABLET ORAL DAILY
Status: DISCONTINUED | OUTPATIENT
Start: 2024-04-07 | End: 2024-04-09

## 2024-04-07 RX ORDER — OLANZAPINE 10 MG/2ML
2.5 INJECTION, POWDER, FOR SOLUTION INTRAMUSCULAR EVERY 6 HOURS PRN
Status: DISCONTINUED | OUTPATIENT
Start: 2024-04-07 | End: 2024-04-10 | Stop reason: HOSPADM

## 2024-04-07 RX ORDER — CARVEDILOL 25 MG/1
25 TABLET ORAL 2 TIMES DAILY
Status: DISCONTINUED | OUTPATIENT
Start: 2024-04-07 | End: 2024-04-10 | Stop reason: HOSPADM

## 2024-04-07 RX ORDER — CARVEDILOL 3.12 MG/1
6.25 TABLET ORAL 2 TIMES DAILY
Status: DISCONTINUED | OUTPATIENT
Start: 2024-04-07 | End: 2024-04-07

## 2024-04-07 RX ORDER — TRAZODONE HYDROCHLORIDE 50 MG/1
25 TABLET ORAL NIGHTLY
Status: DISCONTINUED | OUTPATIENT
Start: 2024-04-07 | End: 2024-04-10 | Stop reason: HOSPADM

## 2024-04-07 RX ORDER — CARVEDILOL 12.5 MG/1
12.5 TABLET ORAL 2 TIMES DAILY
Status: DISCONTINUED | OUTPATIENT
Start: 2024-04-07 | End: 2024-04-07

## 2024-04-07 RX ORDER — CARVEDILOL 3.12 MG/1
6.25 TABLET ORAL ONCE
Status: COMPLETED | OUTPATIENT
Start: 2024-04-07 | End: 2024-04-07

## 2024-04-07 RX ORDER — HALOPERIDOL 5 MG/ML
5 INJECTION INTRAMUSCULAR ONCE
Status: COMPLETED | OUTPATIENT
Start: 2024-04-07 | End: 2024-04-07

## 2024-04-07 RX ORDER — HALOPERIDOL 5 MG/ML
INJECTION INTRAMUSCULAR
Status: COMPLETED
Start: 2024-04-07 | End: 2024-04-07

## 2024-04-07 RX ORDER — ACETAMINOPHEN 500 MG
5 TABLET ORAL NIGHTLY
Status: DISCONTINUED | OUTPATIENT
Start: 2024-04-07 | End: 2024-04-10 | Stop reason: HOSPADM

## 2024-04-07 RX ADMIN — CARVEDILOL 6.25 MG: 3.12 TABLET, FILM COATED ORAL at 09:55

## 2024-04-07 RX ADMIN — HEPARIN SODIUM 5000 UNITS: 5000 INJECTION INTRAVENOUS; SUBCUTANEOUS at 20:00

## 2024-04-07 RX ADMIN — ATORVASTATIN CALCIUM 80 MG: 80 TABLET, FILM COATED ORAL at 21:11

## 2024-04-07 RX ADMIN — TRAZODONE HYDROCHLORIDE 25 MG: 50 TABLET ORAL at 21:11

## 2024-04-07 RX ADMIN — CARVEDILOL 25 MG: 25 TABLET, FILM COATED ORAL at 21:11

## 2024-04-07 RX ADMIN — AMLODIPINE BESYLATE 5 MG: 5 TABLET ORAL at 09:55

## 2024-04-07 RX ADMIN — CARVEDILOL 6.25 MG: 3.12 TABLET, FILM COATED ORAL at 12:17

## 2024-04-07 RX ADMIN — ESMOLOL HYDROCHLORIDE 50 MCG/KG/MIN: 10 INJECTION, SOLUTION INTRAVENOUS at 10:42

## 2024-04-07 RX ADMIN — NICARDIPINE HYDROCHLORIDE 5 MG/HR: 0.2 INJECTION, SOLUTION INTRAVENOUS at 14:22

## 2024-04-07 RX ADMIN — POLYETHYLENE GLYCOL 3350 17 G: 17 POWDER, FOR SOLUTION ORAL at 08:33

## 2024-04-07 RX ADMIN — NICARDIPINE HYDROCHLORIDE 5 MG/HR: 0.2 INJECTION, SOLUTION INTRAVENOUS at 06:18

## 2024-04-07 RX ADMIN — HALOPERIDOL LACTATE 5 MG: 5 INJECTION, SOLUTION INTRAMUSCULAR at 20:00

## 2024-04-07 RX ADMIN — HEPARIN SODIUM 5000 UNITS: 5000 INJECTION INTRAVENOUS; SUBCUTANEOUS at 08:33

## 2024-04-07 RX ADMIN — Medication 5 MG: at 21:11

## 2024-04-07 RX ADMIN — HALOPERIDOL 5 MG: 5 INJECTION INTRAMUSCULAR at 20:00

## 2024-04-07 RX ADMIN — ESMOLOL HYDROCHLORIDE 100 MCG/KG/MIN: 10 INJECTION, SOLUTION INTRAVENOUS at 03:20

## 2024-04-07 ASSESSMENT — PAIN SCALES - GENERAL
PAINLEVEL_OUTOF10: 0 - NO PAIN

## 2024-04-07 ASSESSMENT — PAIN - FUNCTIONAL ASSESSMENT
PAIN_FUNCTIONAL_ASSESSMENT: 0-10

## 2024-04-07 NOTE — CARE PLAN
The patient's goals for the shift include      The clinical goals for the shift include remain HDS and transition IV BP meds to oral    Over the shift, the patient did not make progress toward the following goals. Barriers to progression include confusion. Recommendations to address these barriers include promote sleep wake cycle.

## 2024-04-07 NOTE — CARE PLAN
Problem: Hypertensive Emergency/Crisis  Goal: Blood pressure gradually reduced to goal range  Outcome: Progressing  Goal: Free from signs of organ damage  Outcome: Progressing  Goal: Lab values return to normal range  Outcome: Progressing   The patient's goals for the shift include      The clinical goals for the shift include patient will remain HR & BP stable throughout shift

## 2024-04-07 NOTE — HOSPITAL COURSE
Yonas Solano is a 84 y.o. female with a PMHx of HTN and HLD who was transferred from an outside hospital for concerns of descending artery dissection/type B aortic dissection. Initially patient had chest pain which was constant, for which she presented to OSH ED where where EKGs did not show signs of ischemia and her troponins are negative x 2.      CT angio chest/abd/pelv showed: Linear filling defect in the inferior aspect of the aortic arch, suspicious for focal nonocclusive dissection. She was then started on IV esmolol and IV nicardipine and transferred to [facility] for further management. Cardiac surgery consulted and recommended repeat CTA CAP in 48 hours. Vascular surgery also recommended no surgical intervnetion and impulse controled with agreement on repeat CT in 48 hours. Repeat CTA CAP showed stable dissection.      She was weaned off nicardipine and esmolol gtt, and antihypertensives titrated to carvedilol 25 mg bid, amlodipine 10 mg, and losartan 25 mg daily.

## 2024-04-07 NOTE — PROGRESS NOTES
Yonas Solano is a 84 y.o. female on day 1 of admission presenting with Aortic dissection (CMS/HCC).      Subjective   Confused this morning, but aware that she is confused and this worries her. Denies chest/back/abdominal pain       Objective   Last Recorded Vitals  Heart Rate:  [49-81]   Temp:  [35.9 °C (96.6 °F)-37 °C (98.6 °F)]   Resp:  [12-31]   BP: ()/(41-50)   Weight:  [69 kg (152 lb 1.9 oz)]   SpO2:  [90 %-100 %]     Intake/Output last 3 Shifts:  I/O last 3 completed shifts:  In: 1486.9 (21.5 mL/kg) [P.O.:360; I.V.:1126.9 (16.3 mL/kg)]  Out: 700 (10.1 mL/kg) [Urine:700 (0.3 mL/kg/hr)]  Weight: 69 kg     Elderly female, lying in bed. Appears comfortable but anxious  NCAT, MMM  RRR on monitor  Breathing comfortably on RA  Abdomen soft, nontender  Palpable femoral, pedal pulses  CN II-XII intact b/l, strength and sensation intact b/l UE and LE    Relevant Results  Lab Results   Component Value Date    WBC 8.7 04/07/2024    HGB 11.5 (L) 04/07/2024    HCT 34.4 (L) 04/07/2024    MCV 81 04/07/2024     04/07/2024     Lab Results   Component Value Date    GLUCOSE 137 (H) 04/07/2024    CALCIUM 8.9 04/07/2024     04/07/2024    K 4.2 04/07/2024    CO2 22 04/07/2024     (H) 04/07/2024    BUN 22 04/07/2024    CREATININE 1.17 (H) 04/07/2024       Active Medications  Scheduled medications  amLODIPine, 5 mg, oral, Daily  atorvastatin, 80 mg, oral, Nightly  carvedilol, 6.25 mg, oral, BID  esmolol, 500 mcg/kg, intravenous, Once  heparin (porcine), 5,000 Units, subcutaneous, q12h  polyethylene glycol, 17 g, oral, Daily      Continuous medications  esmolol,  mcg/kg/min, Last Rate: 100 mcg/kg/min (04/07/24 0800)  niCARdipine, 2.5-15 mg/hr, Last Rate: 5 mg/hr (04/07/24 0800)      PRN medications       Assessment/Plan   Principal Problem:    Aortic dissection (CMS/HCC)    84F PMHx HTN admitted to CICU for management of acute Type B(2,3) dissection.     Responding to impulse control, no pain, no  malperfusion  Little confused this morning but hasn't slept well    Plan:  Continue impulse control, transition to PO anti-hypertensives  No indication surgical intervention    Discussed with Dr Kandice Burns MD  PGY5 - Integrated Vascular Surgery         Samuel Burns MD  PGY5 - Integrated Vascular Surgery  [id number]

## 2024-04-07 NOTE — PROGRESS NOTES
Yonas Solano is a 84 y.o. female on day 1 of admission presenting with Type B Aortic Dissection.     Subjective   NAEON.     Patient denies any lightheadedness, dizziness, shortness of breath, chest pain, abdominal pain, or numbness.            Objective   Weight: 63.5 kg (139 lb 15.9 oz) (04/06/24 0421)    Daily Weight  04/07/24 : 69 kg (152 lb 1.9 oz)    Last Recorded Vitals  Heart Rate:  [49-81]   Temp:  [35.9 °C (96.6 °F)-37 °C (98.6 °F)]   Resp:  [12-31]   BP: ()/(40-50)   Weight:  [69 kg (152 lb 1.9 oz)]   SpO2:  [90 %-100 %]          4/7/2024    12:00 AM 4/7/2024     1:00 AM 4/7/2024     2:00 AM 4/7/2024     3:00 AM 4/7/2024     4:00 AM 4/7/2024     5:00 AM 4/7/2024     6:00 AM   Vitals   Systolic 97    105     Diastolic 41    47     Heart Rate 79 76 81 73 74 73 68   Temp 36 °C (96.8 °F)    37 °C (98.6 °F)     Resp 28 17 31 22 20 17 12   Weight (lb)      152.12    BMI      26.1 kg/m2    BSA (m2)      1.77 m2      Intake/Output last 3 Shifts:  I/O last 3 completed shifts:  In: 1486.9 (21.5 mL/kg) [P.O.:360; I.V.:1126.9 (16.3 mL/kg)]  Out: 700 (10.1 mL/kg) [Urine:700 (0.3 mL/kg/hr)]  Weight: 69 kg       Intake/Output Summary (Last 24 hours) at 4/7/2024 0703  Last data filed at 4/7/2024 0600  Gross per 24 hour   Intake 1429.93 ml   Output 550 ml   Net 879.93 ml     Relevant Results  Results from last 7 days   Lab Units 04/07/24  0219 04/06/24  0343 04/05/24  2350   WBC AUTO x10*3/uL 8.7 8.6 6.9   HEMOGLOBIN g/dL 11.5* 12.4 13.1   HEMATOCRIT % 34.4* 38.8 39.3   PLATELETS AUTO x10*3/uL 193 181 184     Results from last 7 days   Lab Units 04/07/24  0219 04/06/24  0758 04/06/24  0343 04/05/24  2350   SODIUM mmol/L 141  --  139 137   POTASSIUM mmol/L 4.2  --  3.9 3.5   CO2 mmol/L 22  --  21 20*   ANION GAP mmol/L 13  --  16 16   BUN mg/dL 22  --  18 22   CREATININE mg/dL 1.17*  --  0.88 1.01   GLUCOSE mg/dL 137*  --  161* 172*   EGFR mL/min/1.73m*2 46*  --  65 55*   MAGNESIUM mg/dL 2.26 1.72  --  1.69    PHOSPHORUS mg/dL 3.2  --   --   --       Results from last 7 days   Lab Units 04/06/24  0343 04/05/24  2350   ALT U/L 14 13   AST U/L 19 20   ALK PHOS U/L 104 96      Results from last 7 days   Lab Units 04/06/24  0346 04/05/24  2350   INR  1.0 1.0     Results from last 7 days   Lab Units 04/06/24  0759   POCT PH, ARTERIAL pH 7.41   POCT PO2, ARTERIAL mm Hg 66*   POCT PCO2, ARTERIAL mm Hg 37*   FIO2 % 21         Results from last 7 days   Lab Units 04/06/24  0346   LACTATE mmol/L 0.8     Results from last 7 days   Lab Units 04/06/24  0114   LIPASE U/L 25     CTA CAP (4/6 ~01:00)  THORACIC AORTA: Initial noncontrast images demonstrate no aortic  intramural hematoma. There is a focal linear filling defect involving  the inferior aortic arch (series 506, image 119). This is suspicious  for focal dissection. There is mild calcified and noncalcified plaque  in the thoracic aorta. The great vessel origins are patent with no  significant stenosis.      ABDOMINAL AORTA: No aortic aneurysm or dissection. Mild calcified and  noncalcified plaque in the abdominal aorta. The celiac, superior  mesenteric, renal and inferior mesenteric arteries are patent with no  significant stenosis.      The bilateral common iliac, internal iliac, external iliac and common  femoral arteries are patent with no significant stenosis.        Physical Exam  Constitutional: No acute distress, resting comfortably in bed, cooperative  HEENT: Normocephalic, atraumatic, PERRLA, EOMI, moist mucous membranes, no pharyngeal erythema  Cardiovascular: RRR, normal S1/S2, no murmurs noted, DP pulses 2+ b/l   Pulmonary: Clear to auscultation b/l, no wheezes/crackles/rhonchi, no increased work of breathing, no supplemental oxygen  GI: Soft, non-tender, non-distended, normoactive bowel sounds  Lower extremities: Warm and well perfused, no lower extremity edema  Neuro: A&O x3, able to move all 4 extremities spontaneously,   Psych: Appropriate mood and affect      Medications  Scheduled:   atorvastatin, 80 mg, oral, Nightly  esmolol, 500 mcg/kg, intravenous, Once  heparin (porcine), 5,000 Units, subcutaneous, q12h  polyethylene glycol, 17 g, oral, Daily      Continuous:   esmolol,  mcg/kg/min, Last Rate: 100 mcg/kg/min (04/07/24 0400)  niCARdipine, 2.5-15 mg/hr, Last Rate: 5 mg/hr (04/07/24 0618)      PRN:              Assessment/Plan   Principal Problem:    Aortic dissection (CMS/HCC)    Yonas Solano is a 84 y.o. female with a past medical history of hypertension and hyperlipidemia who was transferred from an outside hospital for type B aortic dissection.     Updates 4/7:  - Adequate impulse control maintained onn esmolol and nicardipine gtt since arrival to CICU   - Start coreg 6.25 mg this AM, increase to 12.5 by PM pending response  - Start amlodipine 5 mg, consider nifedipine tomorrow if further BP control needed   - New ANGELI likely secondary to contrast nephropathy, less likely propagation of Type B aortic dissection   - Repeat CTA CAP tomorrow AM or earlier if pt develops sx     NEURO  # Hospital acquired delirium  [ ] Delirium precautions     CVS  #Type B aortic dissection  #Focal nonocclusive dissection  #HTN  :: ADD-RS: 1 point  [ ] Impulse control, HR <70, SBP <120   - IV esmolol  - IV nicardipine          - Titrate to PO anti-hypertensives   - Start coreg 6.25 mg this AM, increase to 12.5 by PM pending response   - Start amlodipine 5 mg, potentially increase to 10 mg tomorrow   [ ] Repeat CTA CAP tomorrow AM  [ ] CT surgery and Vascular surgery following         #DLD  [ ] Atorvastatin 80 mg daily      PULM  LILIANA     GI  LILIANA      RENAL  #ANGELI  :: Likely secondary to contrast nephropathy; however, new ANGELI in the setting of type B aortic dissection is concerning for propagation  [ ] CTM    HEME  LILIANA     ID  LILIANA      F: as needed  E: as needed  N: Regular   A: subcutaneous heparin q12  Code Status: FULL CODE  NOK: Aisha Lundberg (daughter) [phone]

## 2024-04-08 ENCOUNTER — APPOINTMENT (OUTPATIENT)
Dept: RADIOLOGY | Facility: HOSPITAL | Age: 84
DRG: 300 | End: 2024-04-08
Payer: MEDICARE

## 2024-04-08 ENCOUNTER — APPOINTMENT (OUTPATIENT)
Dept: CARDIOLOGY | Facility: HOSPITAL | Age: 84
DRG: 300 | End: 2024-04-08
Payer: MEDICARE

## 2024-04-08 LAB
ALBUMIN SERPL BCP-MCNC: 3.4 G/DL (ref 3.4–5)
ANION GAP SERPL CALC-SCNC: 11 MMOL/L (ref 10–20)
ATRIAL RATE: 61 BPM
ATRIAL RATE: 66 BPM
BUN SERPL-MCNC: 22 MG/DL (ref 6–23)
CALCIUM SERPL-MCNC: 8.6 MG/DL (ref 8.6–10.6)
CHLORIDE SERPL-SCNC: 111 MMOL/L (ref 98–107)
CO2 SERPL-SCNC: 22 MMOL/L (ref 21–32)
CREAT SERPL-MCNC: 1.1 MG/DL (ref 0.5–1.05)
EGFRCR SERPLBLD CKD-EPI 2021: 50 ML/MIN/1.73M*2
ERYTHROCYTE [DISTWIDTH] IN BLOOD BY AUTOMATED COUNT: 14.4 % (ref 11.5–14.5)
GLUCOSE SERPL-MCNC: 111 MG/DL (ref 74–99)
HCT VFR BLD AUTO: 33.1 % (ref 36–46)
HGB BLD-MCNC: 11.3 G/DL (ref 12–16)
LACTATE SERPL-SCNC: 1.1 MMOL/L (ref 0.4–2)
MAGNESIUM SERPL-MCNC: 2.04 MG/DL (ref 1.6–2.4)
MCH RBC QN AUTO: 28.1 PG (ref 26–34)
MCHC RBC AUTO-ENTMCNC: 34.1 G/DL (ref 32–36)
MCV RBC AUTO: 82 FL (ref 80–100)
NRBC BLD-RTO: 0 /100 WBCS (ref 0–0)
P AXIS: 56 DEGREES
P AXIS: 57 DEGREES
P OFFSET: 179 MS
P OFFSET: 185 MS
P ONSET: 118 MS
P ONSET: 129 MS
PHOSPHATE SERPL-MCNC: 3.6 MG/DL (ref 2.5–4.9)
PLATELET # BLD AUTO: 161 X10*3/UL (ref 150–450)
POTASSIUM SERPL-SCNC: 4.3 MMOL/L (ref 3.5–5.3)
PR INTERVAL: 180 MS
PR INTERVAL: 184 MS
Q ONSET: 210 MS
Q ONSET: 219 MS
QRS COUNT: 10 BEATS
QRS COUNT: 11 BEATS
QRS DURATION: 80 MS
QRS DURATION: 88 MS
QT INTERVAL: 440 MS
QT INTERVAL: 494 MS
QTC CALCULATION(BAZETT): 461 MS
QTC CALCULATION(BAZETT): 497 MS
QTC FREDERICIA: 454 MS
QTC FREDERICIA: 496 MS
R AXIS: -2 DEGREES
R AXIS: -9 DEGREES
RBC # BLD AUTO: 4.02 X10*6/UL (ref 4–5.2)
SODIUM SERPL-SCNC: 140 MMOL/L (ref 136–145)
T AXIS: 25 DEGREES
T AXIS: 53 DEGREES
T OFFSET: 439 MS
T OFFSET: 457 MS
VENTRICULAR RATE: 61 BPM
VENTRICULAR RATE: 66 BPM
WBC # BLD AUTO: 6.6 X10*3/UL (ref 4.4–11.3)

## 2024-04-08 PROCEDURE — 2500000001 HC RX 250 WO HCPCS SELF ADMINISTERED DRUGS (ALT 637 FOR MEDICARE OP)

## 2024-04-08 PROCEDURE — 99291 CRITICAL CARE FIRST HOUR: CPT

## 2024-04-08 PROCEDURE — 2500000004 HC RX 250 GENERAL PHARMACY W/ HCPCS (ALT 636 FOR OP/ED)

## 2024-04-08 PROCEDURE — 37799 UNLISTED PX VASCULAR SURGERY: CPT

## 2024-04-08 PROCEDURE — 2500000001 HC RX 250 WO HCPCS SELF ADMINISTERED DRUGS (ALT 637 FOR MEDICARE OP): Performed by: STUDENT IN AN ORGANIZED HEALTH CARE EDUCATION/TRAINING PROGRAM

## 2024-04-08 PROCEDURE — 71275 CT ANGIOGRAPHY CHEST: CPT | Performed by: INTERNAL MEDICINE

## 2024-04-08 PROCEDURE — 85027 COMPLETE CBC AUTOMATED: CPT

## 2024-04-08 PROCEDURE — 97161 PT EVAL LOW COMPLEX 20 MIN: CPT | Mod: GP

## 2024-04-08 PROCEDURE — 74174 CTA ABD&PLVS W/CONTRAST: CPT | Performed by: RADIOLOGY

## 2024-04-08 PROCEDURE — 83605 ASSAY OF LACTIC ACID: CPT

## 2024-04-08 PROCEDURE — 2550000001 HC RX 255 CONTRASTS: Performed by: INTERNAL MEDICINE

## 2024-04-08 PROCEDURE — 97165 OT EVAL LOW COMPLEX 30 MIN: CPT | Mod: GO

## 2024-04-08 PROCEDURE — 93005 ELECTROCARDIOGRAM TRACING: CPT

## 2024-04-08 PROCEDURE — 71275 CT ANGIOGRAPHY CHEST: CPT

## 2024-04-08 PROCEDURE — 74174 CTA ABD&PLVS W/CONTRAST: CPT

## 2024-04-08 PROCEDURE — 83735 ASSAY OF MAGNESIUM: CPT

## 2024-04-08 PROCEDURE — 80069 RENAL FUNCTION PANEL: CPT

## 2024-04-08 PROCEDURE — 1100000001 HC PRIVATE ROOM DAILY

## 2024-04-08 RX ORDER — ENOXAPARIN SODIUM 100 MG/ML
40 INJECTION SUBCUTANEOUS EVERY 24 HOURS
Status: DISCONTINUED | OUTPATIENT
Start: 2024-04-08 | End: 2024-04-10 | Stop reason: HOSPADM

## 2024-04-08 RX ORDER — HEPARIN SODIUM 5000 [USP'U]/ML
5000 INJECTION, SOLUTION INTRAVENOUS; SUBCUTANEOUS EVERY 8 HOURS
Status: DISCONTINUED | OUTPATIENT
Start: 2024-04-08 | End: 2024-04-08 | Stop reason: ALTCHOICE

## 2024-04-08 RX ADMIN — ATORVASTATIN CALCIUM 80 MG: 80 TABLET, FILM COATED ORAL at 20:33

## 2024-04-08 RX ADMIN — IOHEXOL 60 ML: 350 INJECTION, SOLUTION INTRAVENOUS at 11:51

## 2024-04-08 RX ADMIN — AMLODIPINE BESYLATE 5 MG: 5 TABLET ORAL at 08:25

## 2024-04-08 RX ADMIN — IOHEXOL 60 ML: 350 INJECTION, SOLUTION INTRAVENOUS at 11:46

## 2024-04-08 RX ADMIN — TRAZODONE HYDROCHLORIDE 25 MG: 50 TABLET ORAL at 20:33

## 2024-04-08 RX ADMIN — HEPARIN SODIUM 5000 UNITS: 5000 INJECTION INTRAVENOUS; SUBCUTANEOUS at 08:24

## 2024-04-08 RX ADMIN — ENOXAPARIN SODIUM 40 MG: 100 INJECTION SUBCUTANEOUS at 17:38

## 2024-04-08 RX ADMIN — CARVEDILOL 25 MG: 25 TABLET, FILM COATED ORAL at 08:25

## 2024-04-08 RX ADMIN — Medication 5 MG: at 20:33

## 2024-04-08 RX ADMIN — CARVEDILOL 25 MG: 25 TABLET, FILM COATED ORAL at 20:33

## 2024-04-08 ASSESSMENT — COGNITIVE AND FUNCTIONAL STATUS - GENERAL
DAILY ACTIVITIY SCORE: 22
MOBILITY SCORE: 19
TOILETING: A LITTLE
HELP NEEDED FOR BATHING: A LITTLE
TURNING FROM BACK TO SIDE WHILE IN FLAT BAD: A LITTLE
CLIMB 3 TO 5 STEPS WITH RAILING: A LITTLE
TURNING FROM BACK TO SIDE WHILE IN FLAT BAD: A LITTLE
CLIMB 3 TO 5 STEPS WITH RAILING: A LITTLE
MOBILITY SCORE: 19
MOVING TO AND FROM BED TO CHAIR: A LITTLE
STANDING UP FROM CHAIR USING ARMS: A LITTLE
WALKING IN HOSPITAL ROOM: A LITTLE
MOVING TO AND FROM BED TO CHAIR: A LITTLE
STANDING UP FROM CHAIR USING ARMS: A LITTLE
TOILETING: A LITTLE
HELP NEEDED FOR BATHING: A LITTLE
DAILY ACTIVITIY SCORE: 22
WALKING IN HOSPITAL ROOM: A LITTLE

## 2024-04-08 ASSESSMENT — PAIN - FUNCTIONAL ASSESSMENT
PAIN_FUNCTIONAL_ASSESSMENT: 0-10

## 2024-04-08 ASSESSMENT — PAIN SCALES - GENERAL
PAINLEVEL_OUTOF10: 0 - NO PAIN

## 2024-04-08 ASSESSMENT — ACTIVITIES OF DAILY LIVING (ADL)
ADL_ASSISTANCE: INDEPENDENT
BATHING_ASSISTANCE: MINIMAL
ADL_ASSISTANCE: INDEPENDENT

## 2024-04-08 NOTE — PROGRESS NOTES
Occupational Therapy    Evaluation    Patient Name: Yonas Solano  MRN: [id number]  Today's Date: 4/8/2024  Time Calculation  Start Time: 1022  Stop Time: 1037  Time Calculation (min): 15 min    Assessment  IP OT Assessment  OT Assessment: Patient will benefit from continued OT treatment while in-house to improve balance, endurance, and ADL completion  Prognosis: Good  Evaluation/Treatment Tolerance: Patient tolerated treatment well  Medical Staff Made Aware: Yes  End of Session Communication: Bedside nurse  End of Session Patient Position: Bed, 3 rail up, Alarm off, not on at start of session, Alarm off, caregiver present  Plan:  Treatment Interventions: ADL retraining, Functional transfer training, Endurance training, Patient/family training  OT Frequency: 2 times per week  OT Discharge Recommendations: Low intensity level of continued care  OT Recommended Transfer Status: Minimal assist, Assist of 2  OT - OK to Discharge: Yes (pending medical clearance)    Subjective   Current Problem:  1. Aortic dissection (CMS/HCC)  Transthoracic Echo (TTE) Complete    Transthoracic Echo (TTE) Complete    CANCELED: Transthoracic Echo (TTE) Complete    CANCELED: Transthoracic Echo (TTE) Complete        General:  General  Reason for Referral: presented from [facility] with Type B aortic dissection found on CTA. Patient initially presented to the ED with chest pain and dizziness. EKG unremarkable, troponin WNL. CT angio CAP showed a linear filling defect at the inferior aortic arch suspicious for dissection. She was started on esmolol and nicaridipine and transferred to [facility] for further management. Since arrival to [facility] patient has been HDS.  Past Medical History Relevant to Rehab: PMHx of HTN and HLD  Family/Caregiver Present: Yes  Co-Treatment: PT  Co-Treatment Reason: Co-treatment with PT to maximize pt safety  Prior to Session Communication: Bedside nurse  Patient Position Received: Bed, 3 rail up, Alarm off, not on at start  "of session  General Comment: Pt supine in bed upon arrival; pleasant and cooperative throughout session; Patient's zeke present in room; (+) tele monitor  Precautions:  Medical Precautions: Cardiac precautions, Fall precautions  Vital Signs:  Heart Rate: 58 (post: 56)  SpO2: 93 % (post 95%)  BP: 99/54 (post: 108/87)  MAP (mmHg): 67  Pain:  Pain Assessment  Pain Assessment: 0-10  Pain Score: 0 - No pain    Objective   Cognition:  Overall Cognitive Status: Within Functional Limits (Per chart review, patient experieced delirium this admit however cognition WFL during session)  Orientation Level: Oriented X4  Cognition Test Scores  Cognition Tests:  (CAM (-))  Confusion Assessment Method (CAM)  Acute Onset and Fluctuating Course (1A): No     Home Living:  Type of Home: House  Lives With:  (granddaughter and grandson; per patient, grandson is always home and can assist as needed; daughter lives nearby and patient stays with daughter occasionally)  Home Adaptive Equipment: Cane  Home Layout: One level  Home Access:  (Patient reports there is a railing present to enter home)  Bathroom Shower/Tub: Walk-in shower  Bathroom Equipment:  (Patient's daughter reports that they can get equipment for the shower as needed)  Home Living Comments: patient's grandson cooks however patient \"supervises,\" (-) drives, enjoys watching TV   Prior Function:  Level of [place]: Independent with ADLs and functional transfers (Patient reports using a cane outside the home and \"furniture walking\" within the home)  Receives Help From:  (grandson is available to assist as needed)  ADL Assistance: Independent  Homemaking Assistance:  (grandson completes cooking)  Ambulatory Assistance: Independent (uses cane outside the home)  IADL History:  Mode of Transportation:  ((-) drives; assume family provides transportation as needed)  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, 2023  ADL:  Eating Assistance: Independent " "(Anticipated)  Grooming Assistance: Independent (completed after toileting)  Bathing Assistance: Minimal (Anticipated due to balance)  UE Dressing Assistance: Independent (Patient donnes gown posteriorly in session with Min A for line management however anticipate IND without line management)  LE Dressing Assistance: Independent (Patient donned/doffed sock IND while seated EOB)  Toileting Assistance with Device: Minimal (Patient performed toileting in session with Min A for transfer on/off toilet; she completed hygiene and clothing management IND)  ADL Comments: Dynamic Standing balance CGA-Min A  Activity Tolerance:  Endurance: Endurance does not limit participation in activity  Bed Mobility/Transfers: Bed Mobility  Bed Mobility: Yes  Bed Mobility 1  Bed Mobility 1: Supine to sitting  Level of Assistance 1: Close supervision  Bed Mobility 2  Bed Mobility  2: Sitting to supine  Level of Assistance 2: Close supervision    Transfers  Transfer: Yes  Transfer 1  Transfer From 1: Sit to  Transfer to 1: Stand  Technique 1: Sit to stand  Transfer Device 1:  (HHA x2)  Transfer Level of Assistance 1: Hand held assistance, Minimum assistance  Trials/Comments 1: Min A x1  Transfers 2  Transfer From 2: Stand to  Transfer to 2: Sit  Technique 2: Stand to sit  Transfer Device 2:  (HHA)  Transfer Level of Assistance 2: Contact guard  Transfers 3  Transfer From 3: Sit to, Stand to  Transfer to 3: Toilet  Technique 3: Sit to stand, Stand to sit  Transfer Device 3:  (HHA)  Transfer Level of Assistance 3: Minimum assistance  Trials/Comments 3: Min A x1 with HHA x1      Functional Mobility:  Functional Mobility  Functional Mobility Performed: Yes  Functional Mobility 1  Surface 1: Level tile  Device 1:  (HHA x1-2)  Assistance 1:  (Pt performed FM moderate household distance in hallway; she required CGA-Min A x1 due to occasional instability however improved with distance)  Comments 1: Patient reports \"it feels great\" to " walk  Modalities:     IADL's:   Mode of Transportation:  ((-) drives; assume family provides transportation as needed)  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, 2023  Vision: Vision - Basic Assessment  Current Vision:  (Vision WFL during session)  Sensation:  Light Touch: No apparent deficits  Strength:     Perception:     Coordination:  Movements are Fluid and Coordinated: Yes   Hand Function:  Hand Function  Gross Grasp: Functional  Coordination: Functional  Extremities: RUE   RUE : Within Functional Limits (RUE AROM and strength grossly WFL) and LUE   LUE: Within Functional Limits (LUE AROM/strength grossly WFL)      Outcome Measures: [facility] Daily Activity  Putting on and taking off regular lower body clothing: None  Bathing (including washing, rinsing, drying): A little  Putting on and taking off regular upper body clothing: None  Toileting, which includes using toilet, bedpan or urinal: A little  Taking care of personal grooming such as brushing teeth: None  Eating Meals: None  Daily Activity - Total Score: 22         and Brief Confusion Assessment Method (bCAM)  CAM Result:  (CAM (-))  Education Documentation  Body Mechanics, taught by Jennifer Feliz OT at 4/8/2024 12:22 PM.  Learner: Patient  Readiness: Eager  Method: Explanation  Response: Verbalizes Understanding    Precautions, taught by Jennifer Feliz OT at 4/8/2024 12:22 PM.  Learner: Patient  Readiness: Eager  Method: Explanation  Response: Verbalizes Understanding    Education Comments  No comments found.      Goals:   Encounter Problems       Encounter Problems (Active)       ADLs       Patient will perform UB and LB bathing AE PRN with supervision level of assistance and grab bars. (Progressing)       Start:  04/08/24    Expected End:  04/22/24            Patient will complete toileting including hygiene clothing management/hygiene with supervision level of assistance and grab bars. (Progressing)       Start:   04/08/24    Expected End:  04/22/24               MOBILITY       Patient will perform Functional mobility max Household distances/Community Distances with supervision level of assistance and least restrictive device in order to improve safety and functional mobility. (Progressing)       Start:  04/08/24    Expected End:  04/22/24               TRANSFERS       Patient will complete functional transfer to TOILET with least restrictive device with supervision level of assistance. (Progressing)       Start:  04/08/24    Expected End:  04/22/24

## 2024-04-08 NOTE — PROGRESS NOTES
Physical Therapy    Physical Therapy Evaluation    Patient Name: Yonas Solano  MRN: [id number]  Today's Date: 4/8/2024   Time Calculation  Start Time: 1022  Stop Time: 1037  Time Calculation (min): 15 min    Assessment/Plan   PT Assessment  PT Assessment Results: Decreased strength, Decreased endurance, Impaired balance, Decreased mobility  Rehab Prognosis: Excellent  End of Session Communication: Bedside nurse  End of Session Patient Position: Bed, 3 rail up, Alarm off, caregiver present  IP OR SWING BED PT PLAN  Inpatient or Swing Bed: Inpatient  PT Plan  Treatment/Interventions: Bed mobility, Transfer training, Gait training, Stair training, Balance training, Strengthening, Endurance training, Therapeutic exercise, Therapeutic activity  PT Plan: Skilled PT  PT Frequency: 3 times per week  PT Discharge Recommendations: Low intensity level of continued care  PT Recommended Transfer Status: Contact guard  PT - OK to Discharge: Yes    Subjective     General Visit Information:  General  Reason for Referral: Type B aortic dissection  Past Medical History Relevant to Rehab: PMHx of HTN and HLD  Family/Caregiver Present: Yes  Caregiver Feedback: Daughter present and supportive  Co-Treatment: OT  Co-Treatment Reason: co-treat with OT to maximize mobility safely  Prior to Session Communication: Bedside nurse  Patient Position Received: Bed, 3 rail up, Alarm off, not on at start of session  General Comment: Pt pleasant and cooperative  with therapy. Telemetry.    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; likes to watch TV    Prior Level of Function:  Prior Function Per Pt/Caregiver Report  Level of  [place]: Independent with ADLs and functional transfers  ADL Assistance: Independent  Ambulatory Assistance: Independent (No AD for household distrance ambulation; uses cane for community ambulation; Denies falls)    Precautions:  Precautions  Medical Precautions: Cardiac precautions, Fall precautions  Precautions Comment: Impulse control: SBP <120; HR < 70    Vital Signs:  Vital Signs  Heart Rate:  (PRE: 58; POST: 56)  Resp:  (PRE: 19; POST: 21)  SpO2:  (PRE: 93%; POST; 94%)  BP:  (PRE: 99/54; POST: 108/87)    Objective     Pain:  Pain Assessment  Pain Assessment: 0-10  Pain Score: 0 - No pain    Cognition:  Cognition  Overall Cognitive Status: Within Functional Limits  Arousal/Alertness: Appropriate responses to stimuli  Orientation Level: Oriented X4  Following Commands: Follows all commands and directions without difficulty    General Assessments:   Activity Tolerance  Early Mobility/Exercise Safety Screen: Proceed with mobilization - No exclusion criteria met    Sensation  Light Touch: No apparent deficits    Strength  Strength Comments: B LE strength >/= 4-/5 throughout  Strength  Strength Comments: B LE strength >/= 4-/5 throughout    Static Sitting Balance  Static Sitting-Balance Support: No upper extremity supported, Feet supported  Static Sitting-Level of Assistance: Close supervision  Dynamic Sitting Balance  Dynamic Sitting-Balance Support: No upper extremity supported, Feet supported  Dynamic Sitting-Comments: CGA    Static Standing Balance  Static Standing-Balance Support: No upper extremity supported  Static Standing-Level of Assistance: Contact guard  Dynamic Standing Balance  Dynamic Standing-Balance Support: No upper extremity supported  Dynamic Standing-Comments: CGA    Functional Assessments:  Bed Mobility  Bed Mobility: Yes  Bed Mobility 1  Bed Mobility 1: Supine to sitting  Level of Assistance 1: Close supervision  Bed Mobility Comments 1: HOB elevated  Bed Mobility 2  Bed Mobility  2:  Sitting to supine  Level of Assistance 2: Close supervision  Bed Mobility Comments 2: HOB flat    Transfers  Transfer: Yes  Transfer 1  Transfer From 1: Sit to  Transfer to 1: Stand  Technique 1: Sit to stand  Transfer Device 1:  (HHA)  Transfer Level of Assistance 1: Minimum assistance (x1 person)  Trials/Comments 1: x 2 trials; increased time to rise  Transfers 2  Transfer From 2: Stand to  Transfer to 2: Sit  Technique 2: Stand to sit  Transfer Device 2:  (HHA)  Transfer Level of Assistance 2: Contact guard  Trials/Comments 2: x2 trials    Ambulation/Gait Training  Ambulation/Gait Training Performed: Yes  Ambulation/Gait Training 1  Surface 1: Level tile  Device 1: No device  Assistance 1:  (CGA-minAx1 with cues for sequencing)  Quality of Gait 1: Forward flexed posture (small step length; occasional moderate sway with slight unsteadiness but improved with increased distance)  Comments/Distance (ft) 1: 100 ft; seated rest break; 8 ft    Extremity/Trunk Assessments:  RLE   RLE : Within Functional Limits  LLE   LLE : Within Functional Limits    Outcome Measures:  [facility] Basic Mobility  Turning from your back to your side while in a flat bed without using bedrails: None  Moving from lying on your back to sitting on the side of a flat bed without using bedrails: A little  Moving to and from bed to chair (including a wheelchair): A little  Standing up from a chair using your arms (e.g. wheelchair or bedside chair): A little  To walk in hospital room: A little  Climbing 3-5 steps with railing: A little  Basic Mobility - Total Score: 19    Confusion Assessment Method-ICU (CAM-ICU)  Feature 1: Acute Onset or Fluctuating Course: Negative  Overall CAM-ICU: Negative    FSS-ICU  Ambulation: Walks >/ or equal to 50 feet with any assistance x1  Rolling: Supervision or set-up only  Sitting: Supervision or set-up only  Transfer Sit-to-Stand: Supervision or set-up only  Transfer Supine-to-Sit: Supervision or set-up only  Total  Score: 22    ICU Mobility Screen  Early Mobility/Exercise Safety Screen: Proceed with mobilization - No exclusion criteria met  E = Exercise and Early Mobility  Early Mobility/Exercise Safety Screen: Proceed with mobilization - No exclusion criteria met  Current Activity: Ambulating in fang    Encounter Problems       Encounter Problems (Active)       Balance       Pt will score >24 on [place] for low risk of falls (Progressing)       Start:  04/08/24    Expected End:  04/22/24               Mobility       Patient will ambulate >250 ft with LRAD and supervision (Progressing)       Start:  04/08/24    Expected End:  04/22/24            Patient will ascend and descend 3 stairs with handrail with supervision (Progressing)       Start:  04/08/24    Expected End:  04/22/24               PT Transfers       Patient will perform bed mobility indep (Progressing)       Start:  04/08/24    Expected End:  04/22/24            Patient will transfer sit to and from stand indep (Progressing)       Start:  04/08/24    Expected End:  04/22/24                 Education Documentation  Mobility Training, taught by Tonie Hickman, PT at 4/8/2024  3:26 PM.  Learner: Patient  Readiness: Acceptance  Method: Explanation  Response: Verbalizes Understanding    Education Comments  No comments found.    Signed by Tonie Hickman DPT

## 2024-04-08 NOTE — PROGRESS NOTES
Yonas Solano is a 84 y.o. female on day 2 of admission presenting with Aortic dissection (CMS/HCC).    Subjective   Yonas Solano is a 84 y.o. female with a PMHx of HTN and HLD who was transferred from an outside hospital for concerns of descending artery dissection/type B aortic dissection. Initially patient had chest pain which was constant, for which she presented to OSH ED where where EKGs did not show signs of ischemia and her troponins are negative x 2.     CT angio chest/abd/pelv showed: Linear filling defect in the inferior aspect of the aortic arch, suspicious for focal nonocclusive dissection. She was then started on IV esmolol and IV nicardipine and transferred to [facility] for further management. Cardiac surgery consulted and recommended repeat CTA CAP in 48 hours. Vascular surgery also recommended no surgical intervnetion and impulse controled with agreement on repeat CT in 48 hours. Repeat CTA CAP showed stable dissection.      She was weaned off nicardipine and esmolol gtt, with introducing carvedilol currently at 25 BID and amlodipine 5 daily.     Objective     Physical Exam  Vitals reviewed.   Constitutional:       Appearance: Normal appearance.   HENT:      Head: Normocephalic and atraumatic.      Mouth/Throat:      Mouth: Mucous membranes are moist.   Eyes:      Extraocular Movements: Extraocular movements intact.      Pupils: Pupils are equal, round, and reactive to light.   Cardiovascular:      Rate and Rhythm: Normal rate and regular rhythm.   Pulmonary:      Effort: Pulmonary effort is normal.      Breath sounds: Normal breath sounds.   Abdominal:      General: Abdomen is flat.      Palpations: Abdomen is soft.   Musculoskeletal:         General: Normal range of motion.      Cervical back: Normal range of motion and neck supple.   Neurological:      General: No focal deficit present.      Mental Status: She is alert and oriented to person, place, and time.   Psychiatric:          "Mood and Affect: Mood normal.         Last Recorded Vitals  Blood pressure 121/52, pulse 63, temperature 36.9 °C (98.4 °F), temperature source Temporal, resp. rate 21, height 1.626 m (5' 4.02\"), weight 70.6 kg (155 lb 10.3 oz), SpO2 92 %.  Intake/Output last 3 Shifts:  I/O last 3 completed shifts:  In: 1228.9 (17.4 mL/kg) [P.O.:120; I.V.:1108.9 (15.7 mL/kg)]  Out: 500 (7.1 mL/kg) [Urine:500 (0.2 mL/kg/hr)]  Weight: 70.6 kg     Relevant Results    Scheduled medications  amLODIPine, 5 mg, oral, Daily  atorvastatin, 80 mg, oral, Nightly  carvedilol, 25 mg, oral, BID  enoxaparin, 40 mg, subcutaneous, q24h  melatonin, 5 mg, oral, Nightly  polyethylene glycol, 17 g, oral, Daily  traZODone, 25 mg, oral, Nightly      Continuous medications     PRN medications  PRN medications: OLANZapine        Assessment/Plan   Principal Problem:    Aortic dissection (CMS/HCC)    Yonas Solano is a 84 y.o. female with a past medical history of hypertension and hyperlipidemia who was transferred from an outside hospital for type B aortic dissection.      Updates 4/8:  - Transfer to the floor if repeat CTA CAP shows stable dissection        #Type B aortic dissection  #Focal nonocclusive dissection  #HTN  :: ADD-RS: 1 point  - Impulse control, HR <70, SBP <120   - IV esmolol (currently off)   - IV nicardipine  (currently off)          - Titrate to PO anti-hypertensives              - Coreg at 25 mg BID currently               - Amlodipine 5mg  - Repeat CTA CAP 4/8 with stable dissection   -BP and HR well controlled with above regiment   - CT surgery and Vascular surgery following      #DLD  [ ] Atorvastatin 80 mg daily     #ANGELI  :: Likely secondary to contrast nephropathy; however, new ANGELI in the setting of type B aortic dissection is concerning for propagation  :: Improving today for 1.17 to 1.10  [ ] CTM    #Delirium   -Zyprexa PRN        F: PRN   E: PRN  N: Regular   A: Lovenox subcutaneous   Code Status: FULL CODE  NOK: Aisha Lundberg " (daughter) [phone]  Dispo: Home, per patient, will live with family in [place] on discharge       Hannah Lawson MD

## 2024-04-08 NOTE — PROGRESS NOTES
Yonas Solano is a 84 y.o. female on day 2 of admission presenting with Aortic dissection (CMS/HCC).      Subjective   No pain. Feels OK  No further confusion       Objective   Last Recorded Vitals  Heart Rate:  [48-73]   Temp:  [36.1 °C (97 °F)-36.9 °C (98.4 °F)]   Resp:  [12-28]   BP: ()/(30-68)   Weight:  [70.6 kg (155 lb 10.3 oz)]   SpO2:  [89 %-97 %]     Intake/Output last 3 Shifts:  I/O last 3 completed shifts:  In: 1228.9 (17.4 mL/kg) [P.O.:120; I.V.:1108.9 (15.7 mL/kg)]  Out: 500 (7.1 mL/kg) [Urine:500 (0.2 mL/kg/hr)]  Weight: 70.6 kg     Elderly female, lying in bed. Appears comfortable  NCAT, MMM  RRR on monitor  Breathing comfortably on RA  Abdomen soft, nontender  Palpable femoral, pedal pulses  CN II-XII intact b/l, strength and sensation intact b/l UE and LE    Relevant Results  Lab Results   Component Value Date    WBC 6.6 04/08/2024    HGB 11.3 (L) 04/08/2024    HCT 33.1 (L) 04/08/2024    MCV 82 04/08/2024     04/08/2024     Lab Results   Component Value Date    GLUCOSE 111 (H) 04/08/2024    CALCIUM 8.6 04/08/2024     04/08/2024    K 4.3 04/08/2024    CO2 22 04/08/2024     (H) 04/08/2024    BUN 22 04/08/2024    CREATININE 1.10 (H) 04/08/2024       Active Medications  Scheduled medications  amLODIPine, 5 mg, oral, Daily  atorvastatin, 80 mg, oral, Nightly  carvedilol, 25 mg, oral, BID  esmolol, 500 mcg/kg, intravenous, Once  heparin (porcine), 5,000 Units, subcutaneous, q12h  melatonin, 5 mg, oral, Nightly  polyethylene glycol, 17 g, oral, Daily  traZODone, 25 mg, oral, Nightly      Continuous medications  esmolol,  mcg/kg/min, Last Rate: Stopped (04/07/24 2005)  niCARdipine, 2.5-15 mg/hr, Last Rate: Stopped (04/07/24 2006)      PRN medications       Assessment/Plan   Principal Problem:    Aortic dissection (CMS/HCC)    84F PMHx HTN admitted to CICU for management of acute Type B(2,3) dissection.     Good impulse control now off IV anti-hypertensive infusions.  Asymptomatic      Plan:  Repeat gated CTA C/A/P today  Impulse control    Discussed with Dr Kandice Burns MD  PGY5 - Integrated Vascular Surgery  [id number]

## 2024-04-08 NOTE — PROGRESS NOTES
Spiritual Care Visit    Clinical Encounter Type  Visited With: Patient and family together  Routine Visit: Introduction  Continue Visiting: Yes    Hindu Encounters  Hindu Needs: Prayer         Sacramental Encounters  Other Sacrament: P&B    Patient received a prayer and blessing by Fr. Daniel Villanueva,  Nondenominational .  Daughter was present.

## 2024-04-08 NOTE — PROGRESS NOTES
Yonas Solano is a 84 y.o. female on day 2 of admission presenting with Aortic dissection (CMS/HCC).    Subjective   Was delerious yesterday, refusing flushes. Given melatonin an traozodne and then IV haldol 5 mg. Prn zyprexa ordered per geriatric agitation order set. A-line was very positional      This morning she was calm and cooperative, was waiting on her repeat CT scan and was aware she will need the study.       Hospital course:   Yonas Solano is a 84 y.o. female with a past medical history of hypertension and hyperlipidemia who was transferred from an outside hospital for concerns of descending artery dissection/type B aortic dissection     Patient was rushed to the ED in an outside hospital where EKGs did not show signs of ischemia and her troponins are negative x 2. BNP is not consistent with CHF exacerbation. CMP and CBC are grossly unremarkable. CT angio chest/abd/pelv showed: Linear filling defect in the inferior aspect of the aortic arch, suspicious for focal nonocclusive dissection. She was then started on IV esmolol and IV nicardipine and transferred to [facility] for further management.     In the [facility] CICU, patient arrived with systolic in the early 100, conscious and alert, stable, no obvious distress.  CBC, CMP, trops, BNP, lactate, coags and chest x-ray was gotten.  CT surgery involved inpatient acceptance and would like to repeat CT angio this morning to evaluate progression of disease before intervention.  A-line was placed and patient to acutely monitor blood pressure for impulse control.    The patient was maintained on nicardipine and esmolol for impulse control, cardiac surgery consulted and recommended repeat CTA CAP in 48 hours. Vascular surgery also recommended no surgical intervnetion and impulse controled with agreement on repeat CT in 48 hours.    She was weaned off nicardipine and esmolol gtt, with introducing carvedilol currently at 25 BID and amlodipine 5 daily.  "  Repeat CTA CAP showed stable dissection.     To do:   [ ] Finalize blood pressure regimen     Objective   Vitals:    04/08/24 0800   BP: 115/57   Pulse: 62   Resp: 14   Temp:    SpO2: 91%       I/O last 3 completed shifts:  In: 1228.9 (17.4 mL/kg) [P.O.:120; I.V.:1108.9 (15.7 mL/kg)]  Out: 500 (7.1 mL/kg) [Urine:500 (0.2 mL/kg/hr)]  Weight: 70.6 kg   No intake/output data recorded.      Vitals:    04/08/24 0554   Weight: 70.6 kg (155 lb 10.3 oz)         Physical Exam  Constitutional: No acute distress, resting comfortably in bed, cooperative  HEENT: Normocephalic, atraumatic, PERRLA, EOMI, moist mucous membranes, no pharyngeal erythema  Cardiovascular: RRR, normal S1/S2, no murmurs noted, DP   Pulmonary: Clear to auscultation b/l, no wheezes/crackles/rhonchi, no increased work of breathing, no supplemental oxygen  GI: Soft, non-tender, non-distended, normoactive bowel sounds  Lower extremities: Warm and well perfused, no lower extremity edema  Neuro: A&O x3, able to move all 4 extremities spontaneously,   Psych: Appropriate mood and affect     Last Recorded Vitals  Blood pressure 115/57, pulse 62, temperature 36.8 °C (98.2 °F), temperature source Temporal, resp. rate 14, height 1.626 m (5' 4.02\"), weight 70.6 kg (155 lb 10.3 oz), SpO2 91 %.  Intake/Output last 3 Shifts:  I/O last 3 completed shifts:  In: 1228.9 (17.4 mL/kg) [P.O.:120; I.V.:1108.9 (15.7 mL/kg)]  Out: 500 (7.1 mL/kg) [Urine:500 (0.2 mL/kg/hr)]  Weight: 70.6 kg     Relevant Results  Results for orders placed or performed during the hospital encounter of 04/06/24 (from the past 24 hour(s))   CBC   Result Value Ref Range    WBC 6.6 4.4 - 11.3 x10*3/uL    nRBC 0.0 0.0 - 0.0 /100 WBCs    RBC 4.02 4.00 - 5.20 x10*6/uL    Hemoglobin 11.3 (L) 12.0 - 16.0 g/dL    Hematocrit 33.1 (L) 36.0 - 46.0 %    MCV 82 80 - 100 fL    MCH 28.1 26.0 - 34.0 pg    MCHC 34.1 32.0 - 36.0 g/dL    RDW 14.4 11.5 - 14.5 %    Platelets 161 150 - 450 x10*3/uL   Renal Function Panel "   Result Value Ref Range    Glucose 111 (H) 74 - 99 mg/dL    Sodium 140 136 - 145 mmol/L    Potassium 4.3 3.5 - 5.3 mmol/L    Chloride 111 (H) 98 - 107 mmol/L    Bicarbonate 22 21 - 32 mmol/L    Anion Gap 11 10 - 20 mmol/L    Urea Nitrogen 22 6 - 23 mg/dL    Creatinine 1.10 (H) 0.50 - 1.05 mg/dL    eGFR 50 (L) >60 mL/min/1.73m*2    Calcium 8.6 8.6 - 10.6 mg/dL    Phosphorus 3.6 2.5 - 4.9 mg/dL    Albumin 3.4 3.4 - 5.0 g/dL   Magnesium   Result Value Ref Range    Magnesium 2.04 1.60 - 2.40 mg/dL   Lactate   Result Value Ref Range    Lactate 1.1 0.4 - 2.0 mmol/L   ECG 12 lead   Result Value Ref Range    Ventricular Rate 66 BPM    Atrial Rate 66 BPM    LA Interval 180 ms    QRS Duration 80 ms    QT Interval 440 ms    QTC Calculation(Bazett) 461 ms    P Axis 57 degrees    R Axis -9 degrees    T Axis 53 degrees    QRS Count 11 beats    Q Onset 219 ms    P Onset 129 ms    P Offset 185 ms    T Offset 439 ms    QTC Fredericia 454 ms     Scheduled medications  amLODIPine, 5 mg, oral, Daily  atorvastatin, 80 mg, oral, Nightly  carvedilol, 25 mg, oral, BID  heparin (porcine), 5,000 Units, subcutaneous, q12h  iohexol, 60 mL, intravenous, Once in imaging  melatonin, 5 mg, oral, Nightly  polyethylene glycol, 17 g, oral, Daily  traZODone, 25 mg, oral, Nightly      Continuous medications     PRN medications  PRN medications: OLANZapine        Assessment/Plan   Principal Problem:    Aortic dissection (CMS/HCC)    Yonas Solano is a 84 y.o. female with a past medical history of hypertension and hyperlipidemia who was transferred from an outside hospital for type B aortic dissection.      Updates 4/8:  - Transfer to the floor if repeat CTA CAP shows stable dissection      NEURO  # Hospital acquired delirium  [ ] Delirium precautions      CVS  #Type B aortic dissection  #Focal nonocclusive dissection  #HTN  :: ADD-RS: 1 point  [ ] Impulse control, HR <70, SBP <120   - IV esmolol (currently off)   - IV nicardipine  (currently off)           - Titrate to PO anti-hypertensives              - Coreg at 25 mg BID currently               - Start amlodipine 5 mg, will continue with current regimen   [ ] Repeat CTA CAP today AM  [ ] CT surgery and Vascular surgery following         #DLD  [ ] Atorvastatin 80 mg daily      PULM  LILIANA     GI  LILIANA      RENAL  #ANGELI  :: Likely secondary to contrast nephropathy; however, new ANGELI in the setting of type B aortic dissection is concerning for propagation  :: Improving today for 1.17 to 1.10  [ ] CTM     HEME  LILIANA     ID  LILIANA      F: as needed  E: as needed  N: Regular   A: Lovenox subcutaneous   Code Status: FULL CODE  NOK: Aisha Lundberg (daughter) [phone]      Lorri Knapp MD

## 2024-04-09 LAB
ABO GROUP (TYPE) IN BLOOD: NORMAL
ALBUMIN SERPL BCP-MCNC: 3.4 G/DL (ref 3.4–5)
ANION GAP SERPL CALC-SCNC: 12 MMOL/L (ref 10–20)
ANTIBODY SCREEN: NORMAL
BASOPHILS # BLD AUTO: 0.03 X10*3/UL (ref 0–0.1)
BASOPHILS NFR BLD AUTO: 0.5 %
BUN SERPL-MCNC: 18 MG/DL (ref 6–23)
CALCIUM SERPL-MCNC: 8.8 MG/DL (ref 8.6–10.6)
CHLORIDE SERPL-SCNC: 110 MMOL/L (ref 98–107)
CO2 SERPL-SCNC: 25 MMOL/L (ref 21–32)
CREAT SERPL-MCNC: 1.04 MG/DL (ref 0.5–1.05)
EGFRCR SERPLBLD CKD-EPI 2021: 53 ML/MIN/1.73M*2
EOSINOPHIL # BLD AUTO: 0.2 X10*3/UL (ref 0–0.4)
EOSINOPHIL NFR BLD AUTO: 3.2 %
ERYTHROCYTE [DISTWIDTH] IN BLOOD BY AUTOMATED COUNT: 14.5 % (ref 11.5–14.5)
GLUCOSE SERPL-MCNC: 77 MG/DL (ref 74–99)
HCT VFR BLD AUTO: 34.1 % (ref 36–46)
HGB BLD-MCNC: 11.6 G/DL (ref 12–16)
IMM GRANULOCYTES # BLD AUTO: 0.03 X10*3/UL (ref 0–0.5)
IMM GRANULOCYTES NFR BLD AUTO: 0.5 % (ref 0–0.9)
LYMPHOCYTES # BLD AUTO: 1.25 X10*3/UL (ref 0.8–3)
LYMPHOCYTES NFR BLD AUTO: 20 %
MAGNESIUM SERPL-MCNC: 1.96 MG/DL (ref 1.6–2.4)
MCH RBC QN AUTO: 27.4 PG (ref 26–34)
MCHC RBC AUTO-ENTMCNC: 34 G/DL (ref 32–36)
MCV RBC AUTO: 81 FL (ref 80–100)
MONOCYTES # BLD AUTO: 0.54 X10*3/UL (ref 0.05–0.8)
MONOCYTES NFR BLD AUTO: 8.6 %
NEUTROPHILS # BLD AUTO: 4.21 X10*3/UL (ref 1.6–5.5)
NEUTROPHILS NFR BLD AUTO: 67.2 %
NRBC BLD-RTO: 0 /100 WBCS (ref 0–0)
PHOSPHATE SERPL-MCNC: 3.1 MG/DL (ref 2.5–4.9)
PLATELET # BLD AUTO: 156 X10*3/UL (ref 150–450)
POTASSIUM SERPL-SCNC: 3.9 MMOL/L (ref 3.5–5.3)
RBC # BLD AUTO: 4.23 X10*6/UL (ref 4–5.2)
RH FACTOR (ANTIGEN D): NORMAL
SODIUM SERPL-SCNC: 143 MMOL/L (ref 136–145)
WBC # BLD AUTO: 6.3 X10*3/UL (ref 4.4–11.3)

## 2024-04-09 PROCEDURE — 2500000001 HC RX 250 WO HCPCS SELF ADMINISTERED DRUGS (ALT 637 FOR MEDICARE OP)

## 2024-04-09 PROCEDURE — 2500000004 HC RX 250 GENERAL PHARMACY W/ HCPCS (ALT 636 FOR OP/ED)

## 2024-04-09 PROCEDURE — 36415 COLL VENOUS BLD VENIPUNCTURE: CPT

## 2024-04-09 PROCEDURE — 85025 COMPLETE CBC W/AUTO DIFF WBC: CPT

## 2024-04-09 PROCEDURE — 1100000001 HC PRIVATE ROOM DAILY

## 2024-04-09 PROCEDURE — 86901 BLOOD TYPING SEROLOGIC RH(D): CPT

## 2024-04-09 PROCEDURE — 83735 ASSAY OF MAGNESIUM: CPT

## 2024-04-09 PROCEDURE — 80069 RENAL FUNCTION PANEL: CPT

## 2024-04-09 PROCEDURE — 2500000001 HC RX 250 WO HCPCS SELF ADMINISTERED DRUGS (ALT 637 FOR MEDICARE OP): Performed by: STUDENT IN AN ORGANIZED HEALTH CARE EDUCATION/TRAINING PROGRAM

## 2024-04-09 PROCEDURE — 99233 SBSQ HOSP IP/OBS HIGH 50: CPT

## 2024-04-09 RX ORDER — AMLODIPINE BESYLATE 10 MG/1
10 TABLET ORAL DAILY
Status: DISCONTINUED | OUTPATIENT
Start: 2024-04-10 | End: 2024-04-10 | Stop reason: HOSPADM

## 2024-04-09 RX ORDER — LOSARTAN POTASSIUM 25 MG/1
25 TABLET ORAL 2 TIMES DAILY
Status: DISCONTINUED | OUTPATIENT
Start: 2024-04-09 | End: 2024-04-10 | Stop reason: HOSPADM

## 2024-04-09 RX ADMIN — ATORVASTATIN CALCIUM 80 MG: 80 TABLET, FILM COATED ORAL at 20:54

## 2024-04-09 RX ADMIN — ENOXAPARIN SODIUM 40 MG: 100 INJECTION SUBCUTANEOUS at 15:56

## 2024-04-09 RX ADMIN — LOSARTAN POTASSIUM 25 MG: 25 TABLET, FILM COATED ORAL at 20:54

## 2024-04-09 RX ADMIN — POLYETHYLENE GLYCOL 3350 17 G: 17 POWDER, FOR SOLUTION ORAL at 08:15

## 2024-04-09 RX ADMIN — LOSARTAN POTASSIUM 25 MG: 25 TABLET, FILM COATED ORAL at 10:31

## 2024-04-09 RX ADMIN — CARVEDILOL 25 MG: 25 TABLET, FILM COATED ORAL at 20:54

## 2024-04-09 RX ADMIN — Medication 5 MG: at 20:55

## 2024-04-09 RX ADMIN — AMLODIPINE BESYLATE 5 MG: 5 TABLET ORAL at 08:15

## 2024-04-09 RX ADMIN — TRAZODONE HYDROCHLORIDE 25 MG: 50 TABLET ORAL at 20:55

## 2024-04-09 RX ADMIN — CARVEDILOL 25 MG: 25 TABLET, FILM COATED ORAL at 08:15

## 2024-04-09 ASSESSMENT — COGNITIVE AND FUNCTIONAL STATUS - GENERAL
MOVING TO AND FROM BED TO CHAIR: A LITTLE
TURNING FROM BACK TO SIDE WHILE IN FLAT BAD: A LITTLE
CLIMB 3 TO 5 STEPS WITH RAILING: A LITTLE
HELP NEEDED FOR BATHING: A LITTLE
TOILETING: A LITTLE
WALKING IN HOSPITAL ROOM: A LITTLE
STANDING UP FROM CHAIR USING ARMS: A LITTLE
DAILY ACTIVITIY SCORE: 22
MOBILITY SCORE: 19

## 2024-04-09 ASSESSMENT — PAIN SCALES - GENERAL: PAINLEVEL_OUTOF10: 0 - NO PAIN

## 2024-04-09 NOTE — PROGRESS NOTES
Yonas Solano is a 84 y.o. female on day 3 of admission presenting with Aortic dissection (CMS/HCC).      Subjective   No pain.     Objective   Last Recorded Vitals  Heart Rate:  [52-63]   Temp:  [36.2 °C (97.2 °F)-36.9 °C (98.4 °F)]   Resp:  [15-22]   BP: (106-141)/(50-73)   Weight:  [70.6 kg (155 lb 10.3 oz)]   SpO2:  [90 %-94 %]     Intake/Output last 3 Shifts:  I/O last 3 completed shifts:  In: 842.2 (12.2 mL/kg) [P.O.:750; I.V.:92.2 (1.3 mL/kg)]  Out: 450 (6.5 mL/kg) [Urine:450 (0.2 mL/kg/hr)]  Dosing Weight: 69 kg     Elderly female, lying in bed. Appears comfortable  NCAT, MMM  RRR on monitor  Breathing comfortably on RA  Abdomen soft, nontender  Palpable femoral, pedal pulses  CN II-XII intact b/l, strength and sensation intact b/l UE and LE    Relevant Results  Lab Results   Component Value Date    WBC 6.3 04/09/2024    HGB 11.6 (L) 04/09/2024    HCT 34.1 (L) 04/09/2024    MCV 81 04/09/2024     04/09/2024     Lab Results   Component Value Date    GLUCOSE 77 04/09/2024    CALCIUM 8.8 04/09/2024     04/09/2024    K 3.9 04/09/2024    CO2 25 04/09/2024     (H) 04/09/2024    BUN 18 04/09/2024    CREATININE 1.04 04/09/2024       Active Medications  Scheduled medications  [START ON 4/10/2024] amLODIPine, 10 mg, oral, Daily  atorvastatin, 80 mg, oral, Nightly  carvedilol, 25 mg, oral, BID  enoxaparin, 40 mg, subcutaneous, q24h  losartan, 25 mg, oral, BID  melatonin, 5 mg, oral, Nightly  polyethylene glycol, 17 g, oral, Daily  traZODone, 25 mg, oral, Nightly      Continuous medications       PRN medications       Assessment/Plan   Principal Problem:    Aortic dissection (CMS/HCC)    84F PMHx HTN admitted to CICU for management of acute Type B(2,3) dissection.     Good impulse control on PO anti-hypertensive regimen. Asymptomatic.    Plan:  Repeat gated CTA C/A/P reviewed, no changes  Continue impulse control  No acute surgical intervention indicated    Discussed with Dr Kandice Daniel  MD Jesus, PhD  Vascular Surgery, PGY2  [id number]

## 2024-04-09 NOTE — PROGRESS NOTES
Subjective   NAEON    Pt denies any chest pain, shortness of breath, nausea, vomiting       Objective     Vitals:  Vitals:    04/09/24 1053   BP: 106/50   Pulse: 54   Resp: 19   Temp: 36.5 °C (97.7 °F)   SpO2: 94%       I/O last 3 completed shifts:  In: 842.2 (12.2 mL/kg) [P.O.:750; I.V.:92.2 (1.3 mL/kg)]  Out: 450 (6.5 mL/kg) [Urine:450 (0.2 mL/kg/hr)]  Dosing Weight: 69 kg   No intake/output data recorded.    Physical exam:  Constitutional: Well-developed female in no acute distress.  HEENT: Normocephalic, atraumatic. PERRL. EOMI. No cervical lymphadenopathy.  Respiratory: CTA bilaterally. No wheezes, rales, or rhonchi. Normal respiratory effort.  Cardiovascular: RRR. No murmurs, gallops, or rubs. No JVD. Radial pulses 2+.  Abdominal: Soft, nondistended, nontender to palpation. Bowel sounds present. No hepatosplenomegaly or masses. No CVA tenderness.  Neuro: CN II-XII intact. UE and LE strength 5/5 bilaterally and sensation intact. Normal FTN testing.  MSK: No LE edema bilaterally.  Skin: Warm, dry. No rashes or wounds.  Psych: Appropriate mood and affect.    Medications:  [START ON 4/10/2024] amLODIPine, 10 mg, oral, Daily  atorvastatin, 80 mg, oral, Nightly  carvedilol, 25 mg, oral, BID  enoxaparin, 40 mg, subcutaneous, q24h  losartan, 25 mg, oral, BID  melatonin, 5 mg, oral, Nightly  polyethylene glycol, 17 g, oral, Daily  traZODone, 25 mg, oral, Nightly         PRN medications: OLANZapine    Labs:  Results for orders placed or performed during the hospital encounter of 04/06/24 (from the past 24 hour(s))   Type and screen   Result Value Ref Range    ABO TYPE O     Rh TYPE NEG     ANTIBODY SCREEN NEG    CBC and Auto Differential   Result Value Ref Range    WBC 6.3 4.4 - 11.3 x10*3/uL    nRBC 0.0 0.0 - 0.0 /100 WBCs    RBC 4.23 4.00 - 5.20 x10*6/uL    Hemoglobin 11.6 (L) 12.0 - 16.0 g/dL    Hematocrit 34.1 (L) 36.0 - 46.0 %    MCV 81 80 - 100 fL    MCH 27.4 26.0 - 34.0 pg    MCHC 34.0 32.0 - 36.0 g/dL    RDW  14.5 11.5 - 14.5 %    Platelets 156 150 - 450 x10*3/uL    Neutrophils % 67.2 40.0 - 80.0 %    Immature Granulocytes %, Automated 0.5 0.0 - 0.9 %    Lymphocytes % 20.0 13.0 - 44.0 %    Monocytes % 8.6 2.0 - 10.0 %    Eosinophils % 3.2 0.0 - 6.0 %    Basophils % 0.5 0.0 - 2.0 %    Neutrophils Absolute 4.21 1.60 - 5.50 x10*3/uL    Immature Granulocytes Absolute, Automated 0.03 0.00 - 0.50 x10*3/uL    Lymphocytes Absolute 1.25 0.80 - 3.00 x10*3/uL    Monocytes Absolute 0.54 0.05 - 0.80 x10*3/uL    Eosinophils Absolute 0.20 0.00 - 0.40 x10*3/uL    Basophils Absolute 0.03 0.00 - 0.10 x10*3/uL   Renal Function Panel   Result Value Ref Range    Glucose 77 74 - 99 mg/dL    Sodium 143 136 - 145 mmol/L    Potassium 3.9 3.5 - 5.3 mmol/L    Chloride 110 (H) 98 - 107 mmol/L    Bicarbonate 25 21 - 32 mmol/L    Anion Gap 12 10 - 20 mmol/L    Urea Nitrogen 18 6 - 23 mg/dL    Creatinine 1.04 0.50 - 1.05 mg/dL    eGFR 53 (L) >60 mL/min/1.73m*2    Calcium 8.8 8.6 - 10.6 mg/dL    Phosphorus 3.1 2.5 - 4.9 mg/dL    Albumin 3.4 3.4 - 5.0 g/dL   Magnesium   Result Value Ref Range    Magnesium 1.96 1.60 - 2.40 mg/dL       Imaging:  CT angio chest w and wo IV contrast    Result Date: 4/8/2024  Interpreted By:  John Conrad  and Garry Ballesteros STUDY: CT ANGIO CHEST W AND WO IV CONTRAST; 4/8/2024 11:48 am   INDICATION: Signs/Symptoms:dissection.   COMPARISON: None.   ACCESSION NUMBER(S): [id number]   ORDERING CLINICIAN: TIFFANIE DANIELS   TECHNIQUE: Using multi-detector CT technology, axial, sequential imaging with prospective gating was performed of the chest following the intravenous administration of contrast material.  A low-osmolar contrast agent was used ( 80 ml of Optiray 350).   CT Dose-Length Product (DLP): Not reported mGy*cm CT Dose Reduction Employed: Yes ( 100 kV, prospective ECG triggering, iterative reconstruction)   For optimization of anatomic evaluation, multiplanar reconstruction, maximum intensity projections, and advanced  3-D off-line postprocessing were performed on a dedicated stand-alone workstation by the interpreting physician.   FINDINGS: Potential study limitations:  None.   THORACIC AORTA: There is evidence of a localized intimal tear/dissection without hematoma of the mid transverse arch at the level of the left subclavian artery origin. There is diffuse noncalcified/calcified plaque in the aortic arch and descending thoracic aorta with focal regions of shallow ulceration (e.g. image 244/288). The origin of the left subclavian artery has calcified plaque with minimal stenosis (<25%). The sinotubular junction is  preserved. The arch vessel branching pattern is conventional.  All of the arch branch vessels appear widely patent in their proximal portions. Visualized proximal abdominal aorta is normal. Moderate scattered calcified and noncalcified atherosclerotic disease of the descending thoracic aorta. The celiac trunk, SMA and bilateral renal arteries are normal in caliber.   REPRESENTATIVE MEASUREMENTS OF THE AORTA: Annulus 2.3 x 1.7 cm Root (Sinus of Valsalva) 2.8 x 2.8 x 0.9 cm Sinotubular junction 2.7 cm Mid ascending 3.3 cm Distal ascending 3.2 cm Mid transverse arch 2.7 cm Isthmus 2.5 cm Mid descending 2.2 cm Distal descending (hiatus) 2.0 cm   CORONARY ARTERIES: The examination is not optimized for assessment of the coronary arteries. Normal coronary artery origins.  Right dominant system. PULMONARY ARTERIES: The central pulmonary arteries appear normal (MPA-2.8 cm, RPA- 2.4 cm, LPA- 2.3 cm).   SYSTEMIC AND PULMONARY VEINS: Normal systemic venous and pulmonary venous return. The SVC and IVC are of normal caliber. Normal pulmonary venous anatomy.   CARDIAC CHAMBERS: Normal atrioventricular and ventriculoarterial concordance.   LEFT ATRIUM: Normal size (Area-19.10 cm2)   RIGHT ATRIUM: Normal size (Area-21.80 cm2)   INTERATRIAL SEPTUM: Intact.   LEFT VENTRICLE: Normal size (JULIAN-4.1 cm)   RIGHT VENTRICLE: Normal size  (JULIAN-4.1 cm)   INTERVENTRICULAR SEPTUM: Intact.   AORTIC VALVE: The aortic valve is trileaflet in morphology. No calcifications.   MITRAL VALVE: No thickening/calcification.   PERICARDIUM: There is no pericardial effusion or thickening.       1. Localized intimal tear/dissection of the mid transverse arch without hematoma. This is similar in appearance to the prior CTA 04/06/2024. 2. Diffuse noncalcified/calcified atherosclerotic plaque of the aortic arch and descending thoracic aorta with focal regions of shallow ulceration. 3. Normal aortic dimensions.   Reading Cardiologist: Dr. John Conrad, date: 4/8/2024; 2:15 pm   Extracardiac/extravascular findings will be reported separately by the radiologist.   MACRO: None   Signed by: John Conrad 4/8/2024 2:30 PM Dictation workstation:   [id number]    ECG 12 lead    Result Date: 4/8/2024  Normal sinus rhythm Possible Left atrial enlargement Cannot rule out Anterior infarct , age undetermined Abnormal ECG When compared with ECG of 06-APR-2024 03:18, No significant change was found Confirmed by Siva Melgoza ([id number]) on 4/8/2024 11:39:32 AM           Assessment/Plan   Yonas Solano is a 84 y.o. female with a past medical history of hypertension and hyperlipidemia who was transferred from an outside hospital for type B aortic dissection.      Updates 4/9:  - increased amlodipine to 10 mg   - started losartan 25 mg bid     #Type B aortic dissection  #Focal nonocclusive dissection  #HTN  :: ADD-RS: 1 point  :: s/p IV esmolol  :: s/p IV nicardipine  :: Repeat CTA CAP: (1) localized intimal tear/dissection of the mid transverse arch w/o hematoma. Similar in appearance to prior CTA  -Coreg 25 mg BID  - amlodipine increased to 10 mg   - Goal HR<70, SBP <120  - CT surgery and Vascular surgery following      #DLD  - Atorvastatin 80 mg daily     #ANGELI, improving  :: Likely secondary to contrast nephropathy; however, new ANGELI in the setting of type B aortic dissection is concerning  for propagation  :: Cr 1.17>1.1>1.04  - CTM     #Delirium   -delirium precautions  -Zyprexa prn     F: PRN   E: PRN  N: Regular   A: Lovenox subcutaneous   Code Status: FULL CODE  NOK: Aisha Lundberg (daughter) [phone]  Dispo: Home, per patient, will live with family in [place] on discharge     Patient and plan discussed with Dr. Dodd,    Mansi Allan MD MPH  PGY-1 Internal Medicine

## 2024-04-09 NOTE — CARE PLAN
Problem: ACS/CP/NSTEMI/STEMI  Goal: Lab values return to normal range  Outcome: Progressing  Goal: Promote self management  Outcome: Progressing     Problem: Safety - Medical Restraint  Goal: Remains free of injury from restraints (Restraint for Interference with Medical Device)  Outcome: Progressing  Goal: Free from restraint(s) (Restraint for Interference with Medical Device)  Outcome: Progressing     Problem: Skin  Goal: Decreased wound size/increased tissue granulation at next dressing change  Outcome: Progressing  Goal: Participates in plan/prevention/treatment measures  Outcome: Progressing  Goal: Prevent/manage excess moisture  Outcome: Progressing  Goal: Prevent/minimize sheer/friction injuries  Outcome: Progressing  Flowsheets (Taken 4/8/2024 9010)  Prevent/minimize sheer/friction injuries: Use pull sheet  Goal: Promote/optimize nutrition  Outcome: Progressing  Goal: Promote skin healing  Outcome: Progressing

## 2024-04-10 ENCOUNTER — HOME HEALTH ADMISSION (OUTPATIENT)
Dept: HOME HEALTH SERVICES | Facility: HOME HEALTH | Age: 84
End: 2024-04-10
Payer: MEDICARE

## 2024-04-10 ENCOUNTER — PHARMACY VISIT (OUTPATIENT)
Dept: PHARMACY | Facility: CLINIC | Age: 84
End: 2024-04-10
Payer: COMMERCIAL

## 2024-04-10 ENCOUNTER — DOCUMENTATION (OUTPATIENT)
Dept: HOME HEALTH SERVICES | Facility: HOME HEALTH | Age: 84
End: 2024-04-10
Payer: MEDICARE

## 2024-04-10 VITALS
DIASTOLIC BLOOD PRESSURE: 70 MMHG | RESPIRATION RATE: 19 BRPM | BODY MASS INDEX: 24.41 KG/M2 | SYSTOLIC BLOOD PRESSURE: 145 MMHG | WEIGHT: 143 LBS | HEART RATE: 57 BPM | TEMPERATURE: 97.9 F | HEIGHT: 64 IN | OXYGEN SATURATION: 98 %

## 2024-04-10 DIAGNOSIS — I71.00 DISSECTION OF AORTA, UNSPECIFIED PORTION OF AORTA (MULTI): ICD-10-CM

## 2024-04-10 LAB
ALBUMIN SERPL BCP-MCNC: 3.4 G/DL (ref 3.4–5)
ANION GAP SERPL CALC-SCNC: 15 MMOL/L (ref 10–20)
BASOPHILS # BLD AUTO: 0.03 X10*3/UL (ref 0–0.1)
BASOPHILS NFR BLD AUTO: 0.4 %
BUN SERPL-MCNC: 15 MG/DL (ref 6–23)
CALCIUM SERPL-MCNC: 8.9 MG/DL (ref 8.6–10.6)
CHLORIDE SERPL-SCNC: 109 MMOL/L (ref 98–107)
CO2 SERPL-SCNC: 21 MMOL/L (ref 21–32)
CREAT SERPL-MCNC: 0.87 MG/DL (ref 0.5–1.05)
EGFRCR SERPLBLD CKD-EPI 2021: 66 ML/MIN/1.73M*2
EOSINOPHIL # BLD AUTO: 0.15 X10*3/UL (ref 0–0.4)
EOSINOPHIL NFR BLD AUTO: 2.1 %
ERYTHROCYTE [DISTWIDTH] IN BLOOD BY AUTOMATED COUNT: 14.6 % (ref 11.5–14.5)
GLUCOSE SERPL-MCNC: 75 MG/DL (ref 74–99)
HCT VFR BLD AUTO: 41 % (ref 36–46)
HGB BLD-MCNC: 12.4 G/DL (ref 12–16)
IMM GRANULOCYTES # BLD AUTO: 0.02 X10*3/UL (ref 0–0.5)
IMM GRANULOCYTES NFR BLD AUTO: 0.3 % (ref 0–0.9)
LYMPHOCYTES # BLD AUTO: 1.12 X10*3/UL (ref 0.8–3)
LYMPHOCYTES NFR BLD AUTO: 15.9 %
MAGNESIUM SERPL-MCNC: 1.97 MG/DL (ref 1.6–2.4)
MCH RBC QN AUTO: 27.4 PG (ref 26–34)
MCHC RBC AUTO-ENTMCNC: 30.2 G/DL (ref 32–36)
MCV RBC AUTO: 91 FL (ref 80–100)
MONOCYTES # BLD AUTO: 0.56 X10*3/UL (ref 0.05–0.8)
MONOCYTES NFR BLD AUTO: 7.9 %
NEUTROPHILS # BLD AUTO: 5.17 X10*3/UL (ref 1.6–5.5)
NEUTROPHILS NFR BLD AUTO: 73.4 %
NRBC BLD-RTO: 0 /100 WBCS (ref 0–0)
PHOSPHATE SERPL-MCNC: 2.9 MG/DL (ref 2.5–4.9)
PLATELET # BLD AUTO: 125 X10*3/UL (ref 150–450)
POTASSIUM SERPL-SCNC: 4.1 MMOL/L (ref 3.5–5.3)
RBC # BLD AUTO: 4.52 X10*6/UL (ref 4–5.2)
SODIUM SERPL-SCNC: 141 MMOL/L (ref 136–145)
WBC # BLD AUTO: 7.1 X10*3/UL (ref 4.4–11.3)

## 2024-04-10 PROCEDURE — 2500000004 HC RX 250 GENERAL PHARMACY W/ HCPCS (ALT 636 FOR OP/ED)

## 2024-04-10 PROCEDURE — 99239 HOSP IP/OBS DSCHRG MGMT >30: CPT | Performed by: STUDENT IN AN ORGANIZED HEALTH CARE EDUCATION/TRAINING PROGRAM

## 2024-04-10 PROCEDURE — 83735 ASSAY OF MAGNESIUM: CPT

## 2024-04-10 PROCEDURE — 85025 COMPLETE CBC W/AUTO DIFF WBC: CPT

## 2024-04-10 PROCEDURE — 36415 COLL VENOUS BLD VENIPUNCTURE: CPT

## 2024-04-10 PROCEDURE — 2500000001 HC RX 250 WO HCPCS SELF ADMINISTERED DRUGS (ALT 637 FOR MEDICARE OP)

## 2024-04-10 PROCEDURE — 97110 THERAPEUTIC EXERCISES: CPT | Mod: GP,CQ

## 2024-04-10 PROCEDURE — 80069 RENAL FUNCTION PANEL: CPT

## 2024-04-10 PROCEDURE — RXMED WILLOW AMBULATORY MEDICATION CHARGE

## 2024-04-10 PROCEDURE — 97116 GAIT TRAINING THERAPY: CPT | Mod: GP,CQ

## 2024-04-10 PROCEDURE — 2500000001 HC RX 250 WO HCPCS SELF ADMINISTERED DRUGS (ALT 637 FOR MEDICARE OP): Performed by: STUDENT IN AN ORGANIZED HEALTH CARE EDUCATION/TRAINING PROGRAM

## 2024-04-10 RX ORDER — AMLODIPINE BESYLATE 10 MG/1
10 TABLET ORAL DAILY
Qty: 30 TABLET | Refills: 0 | Status: SHIPPED | OUTPATIENT
Start: 2024-04-11 | End: 2024-05-11

## 2024-04-10 RX ORDER — LOSARTAN POTASSIUM 25 MG/1
25 TABLET ORAL 2 TIMES DAILY
Status: CANCELLED | OUTPATIENT
Start: 2024-04-10

## 2024-04-10 RX ORDER — ATORVASTATIN CALCIUM 80 MG/1
80 TABLET, FILM COATED ORAL NIGHTLY
Qty: 30 TABLET | Refills: 0 | Status: SHIPPED | OUTPATIENT
Start: 2024-04-10 | End: 2024-05-10

## 2024-04-10 RX ORDER — LOSARTAN POTASSIUM 50 MG/1
TABLET ORAL
Qty: 45 TABLET | Refills: 0 | Status: SHIPPED | OUTPATIENT
Start: 2024-04-10 | End: 2024-05-10

## 2024-04-10 RX ORDER — CARVEDILOL 25 MG/1
25 TABLET ORAL 2 TIMES DAILY
Qty: 60 TABLET | Refills: 0 | Status: SHIPPED | OUTPATIENT
Start: 2024-04-10 | End: 2024-05-10

## 2024-04-10 RX ADMIN — CARVEDILOL 25 MG: 25 TABLET, FILM COATED ORAL at 09:05

## 2024-04-10 RX ADMIN — ENOXAPARIN SODIUM 40 MG: 100 INJECTION SUBCUTANEOUS at 16:24

## 2024-04-10 RX ADMIN — LOSARTAN POTASSIUM 25 MG: 25 TABLET, FILM COATED ORAL at 09:05

## 2024-04-10 RX ADMIN — AMLODIPINE BESYLATE 10 MG: 10 TABLET ORAL at 09:05

## 2024-04-10 ASSESSMENT — PAIN - FUNCTIONAL ASSESSMENT: PAIN_FUNCTIONAL_ASSESSMENT: 0-10

## 2024-04-10 ASSESSMENT — COGNITIVE AND FUNCTIONAL STATUS - GENERAL
CLIMB 3 TO 5 STEPS WITH RAILING: A LOT
TURNING FROM BACK TO SIDE WHILE IN FLAT BAD: A LITTLE
MOVING FROM LYING ON BACK TO SITTING ON SIDE OF FLAT BED WITH BEDRAILS: A LITTLE
STANDING UP FROM CHAIR USING ARMS: A LITTLE
WALKING IN HOSPITAL ROOM: A LITTLE
CLIMB 3 TO 5 STEPS WITH RAILING: A LITTLE
TURNING FROM BACK TO SIDE WHILE IN FLAT BAD: A LITTLE
TOILETING: A LITTLE
DAILY ACTIVITIY SCORE: 21
MOBILITY SCORE: 17
WALKING IN HOSPITAL ROOM: A LITTLE
PERSONAL GROOMING: A LITTLE
STANDING UP FROM CHAIR USING ARMS: A LITTLE
MOVING TO AND FROM BED TO CHAIR: A LITTLE
HELP NEEDED FOR BATHING: A LITTLE

## 2024-04-10 ASSESSMENT — PAIN SCALES - GENERAL
PAINLEVEL_OUTOF10: 0 - NO PAIN
PAINLEVEL_OUTOF10: 0 - NO PAIN

## 2024-04-10 NOTE — DISCHARGE INSTRUCTIONS
Dear Yonas Solano,    You were admitted to [facility] for a small tear in your aorta. We consulted vascular surgery to determine if you needed an operation for this tear. Your tear has been stable on our imaging tests, and it was determined that no surgical intervention was required. We started you on blood pressure medications and heart rate medications to prevent this tear from getting worse. If you feel any more chest pain or shortness of breath, we recommend you come back to the ER.     Thank you for choosing  for your care,   It was a pleasure taking care of you,     Please follow-up with the following providers:   -5/16 vascular surgery  -cardiology  Someone will call you in the next 2-3 days to set up an appointment. If you do not receive a call, please call [phone]    Medication changes:   -START:  - amlodipine 10 mg   - atorvastatin 80 mg  - coreg 25 mg bid  - losartan 25 mg (1/2 tablet) in the morning and losartan 50 mg (1 tablets) in the PM

## 2024-04-10 NOTE — CARE PLAN
The patient's goals for the shift include      The clinical goals for the shift include [facility]

## 2024-04-10 NOTE — DISCHARGE SUMMARY
Discharge Diagnosis  Aortic dissection (CMS/HCC)    Issues Requiring Follow-Up  - continued impulse control w/ aortic dissection    Test Results Pending At Discharge  Pending Labs       No current pending labs.            Hospital Course  Yonas Solano is a 84 y.o. female with a PMHx of HTN and HLD who was transferred from an outside hospital for concerns of descending artery dissection/type B aortic dissection. Initially patient had chest pain which was constant, for which she presented to OSH ED where where EKGs did not show signs of ischemia and her troponins are negative x 2.      CT angio chest/abd/pelv showed: Linear filling defect in the inferior aspect of the aortic arch, suspicious for focal nonocclusive dissection. She was then started on IV esmolol and IV nicardipine and transferred to [facility] center for further management. Cardiac surgery consulted and recommended repeat CTA CAP in 48 hours. Vascular surgery also recommended no surgical intervnetion and impulse controled with agreement on repeat CT in 48 hours. Repeat CTA CAP showed stable dissection.      She was weaned off nicardipine and esmolol gtt, and antihypertensives titrated to carvedilol 25 mg bid, amlodipine 10 mg, and losartan 25 mg AM, and 50 mg PM w/ good impulse control.     Pertinent Physical Exam At Time of Discharge  Physical Exam  Constitutional: Well-developed female in no acute distress.  HEENT: Normocephalic, atraumatic. PERRL. EOMI. No cervical lymphadenopathy.  Respiratory: CTA bilaterally. No wheezes, rales, or rhonchi. Normal respiratory effort.  Cardiovascular: RRR. No murmurs, gallops, or rubs. No JVD. Radial pulses 2+.  Abdominal: Soft, nondistended, nontender to palpation. Bowel sounds present. No hepatosplenomegaly or masses. No CVA tenderness.  Neuro: CN II-XII intact. UE and LE strength 5/5 bilaterally and sensation intact. Normal FTN testing.  MSK: No LE edema bilaterally.  Skin: Warm, dry. No rashes or  wounds.  Psych: Appropriate mood and affect.    Home Medications     Medication List      START taking these medications     amLODIPine 10 mg tablet; Commonly known as: Norvasc; Take 1 tablet (10   mg) by mouth once daily.; Start taking on: April 11, 2024   atorvastatin 80 mg tablet; Commonly known as: Lipitor; Take 1 tablet (80   mg) by mouth once daily at bedtime.   carvedilol 25 mg tablet; Commonly known as: Coreg; Take 1 tablet (25 mg)   by mouth 2 times a day.   losartan 50 mg tablet; Commonly known as: Cozaar; Take 0.5 tablets (25   mg) once daily in the morning AND 1 tablet (50 mg) once daily in the   evening.       Outpatient Follow-Up  Future Appointments   Date Time Provider Department Center   5/16/2024  1:00 PM Marquez Novoa MD [id number]     More than 30 minutes were spent reviewing the patient's case and coordinating her medications, follow-ups, and transportation for the discharge plan.      Mansi Allan MD    I saw and evaluated the patient. I personally obtained the key and critical portions of the history and physical exam or was physically present for key and critical portions performed by the resident/fellow. I reviewed the resident/fellow's documentation and discussed the patient with the resident/fellow. I agree with the resident/fellow's medical decision making as documented in the note.    Dragan Dodd MD

## 2024-04-10 NOTE — HH CARE COORDINATION
Home Care received a Referral for Physical Therapy. We have processed the referral for a Start of Care on 04/12/2024.     If you have any questions or concerns, please feel free to contact us at [phone]. Follow the prompts, enter your five digit zip code, and you will be directed to your care team on [facility] 1.

## 2024-04-10 NOTE — PROGRESS NOTES
"Physical Therapy    Physical Therapy Treatment    Patient Name: Yonas Solano  MRN: [id number]  Today's Date: 4/10/2024  Time Calculation  Start Time: 0914  Stop Time: 0940  Time Calculation (min): 26 min       Assessment/Plan   PT Assessment  PT Assessment Results: Decreased strength, Decreased endurance, Impaired balance, Decreased mobility  End of Session Communication: Bedside nurse  Assessment Comment: Pt. is pleasant and cooperative. Pt. tolerated treatment well by amb. further - participating in ther ex and willing to sit up in chair end of session. Pt. states \"That felt good, thank you!\" Pt. reports the team is going to treat her conservatively (with meds /no sx at this time)  End of Session Patient Position: Up in chair  PT Plan  Inpatient/Swing Bed or Outpatient: Inpatient  PT Plan  Treatment/Interventions: Bed mobility, Transfer training, Gait training, Stair training, Balance training, Strengthening, Endurance training, Therapeutic exercise, Therapeutic activity  PT Plan: Skilled PT  PT Frequency: 3 times per week  PT Discharge Recommendations: Low intensity level of continued care  PT Recommended Transfer Status: Contact guard    General Visit Information:      General  Reason for Referral: Type B aortic dissection  Past Medical History Relevant to Rehab: PMHx of HTN and HLD  Missed Visit: No  Family/Caregiver Present: No  Prior to Session Communication: Bedside nurse  Patient Position Received: Bed, 3 rail up, Alarm off, not on at start of session  General Comment: Pt. is pleasant and willing to participate. Pt. states no complaints - very talkative and happy to be mobilizing.    Subjective   Precautions:  Precautions  Medical Precautions: Cardiac precautions, Fall precautions  Vital Signs:       Objective   Pain:  Pain Assessment  Pain Assessment: 0-10  Pain Score:  (No complaints)  Cognition:     Postural Control:  Static Sitting Balance  Static Sitting-Balance Support: No upper extremity " supported, Feet supported  Static Sitting-Level of Assistance: Close supervision  Static Standing Balance  Static Standing-Balance Support: No upper extremity supported  Static Standing-Level of Assistance: Contact guard  Extremity/Trunk Assessments:                      Activity Tolerance:     Treatments:  Therapeutic Exercise  Therapeutic Exercise Performed: Yes  Therapeutic Exercise Activity 1: Supine bilat le ex AP'S, QS, SAQ'S, HS, AND ABD X 15 REPS - Pt. is very talkative therefore redirection required thru out.    Bed Mobility  Bed Mobility: Yes  Bed Mobility 1  Bed Mobility 1: Supine to sitting  Level of Assistance 1: Close supervision (Attempted education on log roll however pt. moves quickly.)    Ambulation/Gait Training  Ambulation/Gait Training Performed: Yes  Ambulation/Gait Training 1  Surface 1:  (Pt. amb. 50' no device but cga/min assist due to decreased steadiness.)  Quality of Gait 1:  (Decreased step length/height-)  Transfers  Transfer: Yes  Transfer 1  Transfer From 1: Sit to  Transfer to 1: Stand  Transfer Level of Assistance 1: Contact guard  Transfers 2  Transfer From 2: Stand to  Transfer to 2: Sit  Transfer Level of Assistance 2: Contact guard  Trials/Comments 2: slightly unsteady.    Outcome Measures:  [facility] Basic Mobility  Turning from your back to your side while in a flat bed without using bedrails: A little  Moving from lying on your back to sitting on the side of a flat bed without using bedrails: A little  Moving to and from bed to chair (including a wheelchair): A little  Standing up from a chair using your arms (e.g. wheelchair or bedside chair): A little  To walk in hospital room: A little  Climbing 3-5 steps with railing: A lot  Basic Mobility - Total Score: 17    Education Documentation  Mobility Training, taught by Shiela Wyatt PTA at 4/10/2024 10:04 AM.  Learner: Patient  Readiness: Acceptance  Method: Explanation, Demonstration  Response: Needs Reinforcement    Education  Comments  No comments found.        OP EDUCATION:       Encounter Problems       Encounter Problems (Active)       Balance       Pt will score >24 on Tinetti for low risk of falls (Progressing)       Start:  04/08/24    Expected End:  04/22/24               Mobility       Patient will ambulate >250 ft with LRAD and supervision (Progressing)       Start:  04/08/24    Expected End:  04/22/24            Patient will ascend and descend 3 stairs with handrail with supervision (Progressing)       Start:  04/08/24    Expected End:  04/22/24               PT Transfers       Patient will perform bed mobility indep (Progressing)       Start:  04/08/24    Expected End:  04/22/24            Patient will transfer sit to and from stand indep (Progressing)       Start:  04/08/24    Expected End:  04/22/24

## 2024-04-10 NOTE — PROGRESS NOTES
4/10/2024 Care coordination  Yonas Solano is a 84 y.o. female on day 4 of admission presenting with Aortic dissection (CMS/HCC).  Will discharge home today.  [facility] received referral and is processig for a SOC.

## 2024-04-16 ENCOUNTER — HOME CARE VISIT (OUTPATIENT)
Dept: HOME HEALTH SERVICES | Facility: HOME HEALTH | Age: 84
End: 2024-04-16

## 2024-05-16 ENCOUNTER — APPOINTMENT (OUTPATIENT)
Dept: RADIOLOGY | Facility: HOSPITAL | Age: 84
End: 2024-05-16
Payer: MEDICARE

## 2024-05-16 ENCOUNTER — APPOINTMENT (OUTPATIENT)
Dept: VASCULAR SURGERY | Facility: CLINIC | Age: 84
End: 2024-05-16
Payer: MEDICARE

## 2024-05-28 DIAGNOSIS — I10 ESSENTIAL (PRIMARY) HYPERTENSION: Primary | ICD-10-CM

## 2024-05-29 ENCOUNTER — LAB (OUTPATIENT)
Dept: LAB | Facility: LAB | Age: 84
End: 2024-05-29
Payer: MEDICARE

## 2024-05-29 DIAGNOSIS — I10 ESSENTIAL (PRIMARY) HYPERTENSION: ICD-10-CM

## 2024-05-29 LAB
ANION GAP SERPL CALC-SCNC: 11 MMOL/L (ref 10–20)
BUN SERPL-MCNC: 25 MG/DL (ref 6–23)
CALCIUM SERPL-MCNC: 9.5 MG/DL (ref 8.6–10.3)
CHLORIDE SERPL-SCNC: 106 MMOL/L (ref 98–107)
CO2 SERPL-SCNC: 28 MMOL/L (ref 21–32)
CREAT SERPL-MCNC: 1.25 MG/DL (ref 0.5–1.05)
EGFRCR SERPLBLD CKD-EPI 2021: 43 ML/MIN/1.73M*2
GLUCOSE SERPL-MCNC: 167 MG/DL (ref 74–99)
POTASSIUM SERPL-SCNC: 3.8 MMOL/L (ref 3.5–5.3)
SODIUM SERPL-SCNC: 141 MMOL/L (ref 136–145)

## 2024-05-29 PROCEDURE — 36415 COLL VENOUS BLD VENIPUNCTURE: CPT

## 2024-05-29 PROCEDURE — 80048 BASIC METABOLIC PNL TOTAL CA: CPT

## 2024-06-25 ENCOUNTER — LAB (OUTPATIENT)
Dept: LAB | Facility: LAB | Age: 84
End: 2024-06-25
Payer: MEDICARE

## 2024-06-25 DIAGNOSIS — I12.9 HYPERTENSIVE CHRONIC KIDNEY DISEASE WITH STAGE 1 THROUGH STAGE 4 CHRONIC KIDNEY DISEASE, OR UNSPECIFIED CHRONIC KIDNEY DISEASE: ICD-10-CM

## 2024-06-25 DIAGNOSIS — E78.49 OTHER HYPERLIPIDEMIA: ICD-10-CM

## 2024-06-25 DIAGNOSIS — R82.998 OTHER ABNORMAL FINDINGS IN URINE: ICD-10-CM

## 2024-06-25 DIAGNOSIS — N18.31 CHRONIC KIDNEY DISEASE, STAGE 3A (MULTI): Primary | ICD-10-CM

## 2024-06-25 LAB
ALBUMIN SERPL BCP-MCNC: 3.9 G/DL (ref 3.4–5)
ALP SERPL-CCNC: 82 U/L (ref 33–136)
ALT SERPL W P-5'-P-CCNC: 10 U/L (ref 7–45)
ANION GAP SERPL CALC-SCNC: 9 MMOL/L (ref 10–20)
AST SERPL W P-5'-P-CCNC: 16 U/L (ref 9–39)
BASOPHILS # BLD AUTO: 0.03 X10*3/UL (ref 0–0.1)
BASOPHILS NFR BLD AUTO: 0.6 %
BILIRUB SERPL-MCNC: 0.7 MG/DL (ref 0–1.2)
BUN SERPL-MCNC: 15 MG/DL (ref 6–23)
CALCIUM SERPL-MCNC: 9.1 MG/DL (ref 8.6–10.3)
CHLORIDE SERPL-SCNC: 109 MMOL/L (ref 98–107)
CHOLEST SERPL-MCNC: 132 MG/DL (ref 0–199)
CHOLESTEROL/HDL RATIO: 3.3
CO2 SERPL-SCNC: 28 MMOL/L (ref 21–32)
CREAT SERPL-MCNC: 0.98 MG/DL (ref 0.5–1.05)
EGFRCR SERPLBLD CKD-EPI 2021: 57 ML/MIN/1.73M*2
EOSINOPHIL # BLD AUTO: 0.16 X10*3/UL (ref 0–0.4)
EOSINOPHIL NFR BLD AUTO: 3.3 %
ERYTHROCYTE [DISTWIDTH] IN BLOOD BY AUTOMATED COUNT: 14.6 % (ref 11.5–14.5)
GLUCOSE SERPL-MCNC: 101 MG/DL (ref 74–99)
HCT VFR BLD AUTO: 36.7 % (ref 36–46)
HDLC SERPL-MCNC: 40.4 MG/DL
HGB BLD-MCNC: 11.5 G/DL (ref 12–16)
IMM GRANULOCYTES # BLD AUTO: 0.01 X10*3/UL (ref 0–0.5)
IMM GRANULOCYTES NFR BLD AUTO: 0.2 % (ref 0–0.9)
LDLC SERPL CALC-MCNC: 72 MG/DL
LYMPHOCYTES # BLD AUTO: 1.2 X10*3/UL (ref 0.8–3)
LYMPHOCYTES NFR BLD AUTO: 24.7 %
MCH RBC QN AUTO: 27.4 PG (ref 26–34)
MCHC RBC AUTO-ENTMCNC: 31.3 G/DL (ref 32–36)
MCV RBC AUTO: 87 FL (ref 80–100)
MONOCYTES # BLD AUTO: 0.38 X10*3/UL (ref 0.05–0.8)
MONOCYTES NFR BLD AUTO: 7.8 %
NEUTROPHILS # BLD AUTO: 3.08 X10*3/UL (ref 1.6–5.5)
NEUTROPHILS NFR BLD AUTO: 63.4 %
NON HDL CHOLESTEROL: 92 MG/DL (ref 0–149)
NRBC BLD-RTO: 0 /100 WBCS (ref 0–0)
PHOSPHATE SERPL-MCNC: 3.2 MG/DL (ref 2.5–4.9)
PLATELET # BLD AUTO: 158 X10*3/UL (ref 150–450)
POTASSIUM SERPL-SCNC: 4 MMOL/L (ref 3.5–5.3)
PROT SERPL-MCNC: 6.2 G/DL (ref 6.4–8.2)
PTH-INTACT SERPL-MCNC: 95.9 PG/ML (ref 18.5–88)
RBC # BLD AUTO: 4.2 X10*6/UL (ref 4–5.2)
SODIUM SERPL-SCNC: 142 MMOL/L (ref 136–145)
TRIGL SERPL-MCNC: 100 MG/DL (ref 0–149)
VLDL: 20 MG/DL (ref 0–40)
WBC # BLD AUTO: 4.9 X10*3/UL (ref 4.4–11.3)

## 2024-06-25 PROCEDURE — 84100 ASSAY OF PHOSPHORUS: CPT

## 2024-06-25 PROCEDURE — 80061 LIPID PANEL: CPT

## 2024-06-25 PROCEDURE — 80053 COMPREHEN METABOLIC PANEL: CPT

## 2024-06-25 PROCEDURE — 36415 COLL VENOUS BLD VENIPUNCTURE: CPT

## 2024-06-25 PROCEDURE — 85025 COMPLETE CBC W/AUTO DIFF WBC: CPT

## 2024-06-25 PROCEDURE — 83970 ASSAY OF PARATHORMONE: CPT

## 2024-08-28 ENCOUNTER — LAB (OUTPATIENT)
Dept: LAB | Facility: LAB | Age: 84
End: 2024-08-28
Payer: MEDICARE

## 2024-08-28 DIAGNOSIS — Z13.89 ENCOUNTER FOR SCREENING FOR OTHER DISORDER: ICD-10-CM

## 2024-08-28 DIAGNOSIS — N18.31 CHRONIC KIDNEY DISEASE, STAGE 3A (MULTI): Primary | ICD-10-CM

## 2024-08-28 DIAGNOSIS — I12.9 HYPERTENSIVE CHRONIC KIDNEY DISEASE WITH STAGE 1 THROUGH STAGE 4 CHRONIC KIDNEY DISEASE, OR UNSPECIFIED CHRONIC KIDNEY DISEASE: ICD-10-CM

## 2024-08-28 DIAGNOSIS — E78.49 OTHER HYPERLIPIDEMIA: ICD-10-CM

## 2024-08-28 LAB
ALBUMIN SERPL BCP-MCNC: 4 G/DL (ref 3.4–5)
ALP SERPL-CCNC: 74 U/L (ref 33–136)
ALT SERPL W P-5'-P-CCNC: 11 U/L (ref 7–45)
ANION GAP SERPL CALC-SCNC: 9 MMOL/L (ref 10–20)
AST SERPL W P-5'-P-CCNC: 16 U/L (ref 9–39)
BASOPHILS # BLD AUTO: 0.04 X10*3/UL (ref 0–0.1)
BASOPHILS NFR BLD AUTO: 0.8 %
BILIRUB SERPL-MCNC: 0.6 MG/DL (ref 0–1.2)
BUN SERPL-MCNC: 22 MG/DL (ref 6–23)
CALCIUM SERPL-MCNC: 9.3 MG/DL (ref 8.6–10.3)
CHLORIDE SERPL-SCNC: 108 MMOL/L (ref 98–107)
CHOLEST SERPL-MCNC: 166 MG/DL (ref 0–199)
CHOLESTEROL/HDL RATIO: 3.5
CO2 SERPL-SCNC: 28 MMOL/L (ref 21–32)
CREAT SERPL-MCNC: 1.04 MG/DL (ref 0.5–1.05)
EGFRCR SERPLBLD CKD-EPI 2021: 53 ML/MIN/1.73M*2
EOSINOPHIL # BLD AUTO: 0.18 X10*3/UL (ref 0–0.4)
EOSINOPHIL NFR BLD AUTO: 3.5 %
ERYTHROCYTE [DISTWIDTH] IN BLOOD BY AUTOMATED COUNT: 14.2 % (ref 11.5–14.5)
GLUCOSE SERPL-MCNC: 86 MG/DL (ref 74–99)
HCT VFR BLD AUTO: 38.1 % (ref 36–46)
HDLC SERPL-MCNC: 48 MG/DL
HGB BLD-MCNC: 12 G/DL (ref 12–16)
IMM GRANULOCYTES # BLD AUTO: 0.01 X10*3/UL (ref 0–0.5)
IMM GRANULOCYTES NFR BLD AUTO: 0.2 % (ref 0–0.9)
LDLC SERPL CALC-MCNC: 102 MG/DL
LYMPHOCYTES # BLD AUTO: 1.46 X10*3/UL (ref 0.8–3)
LYMPHOCYTES NFR BLD AUTO: 28 %
MCH RBC QN AUTO: 27.5 PG (ref 26–34)
MCHC RBC AUTO-ENTMCNC: 31.5 G/DL (ref 32–36)
MCV RBC AUTO: 87 FL (ref 80–100)
MONOCYTES # BLD AUTO: 0.38 X10*3/UL (ref 0.05–0.8)
MONOCYTES NFR BLD AUTO: 7.3 %
NEUTROPHILS # BLD AUTO: 3.14 X10*3/UL (ref 1.6–5.5)
NEUTROPHILS NFR BLD AUTO: 60.2 %
NON HDL CHOLESTEROL: 118 MG/DL (ref 0–149)
NRBC BLD-RTO: 0 /100 WBCS (ref 0–0)
PHOSPHATE SERPL-MCNC: 3.7 MG/DL (ref 2.5–4.9)
PLATELET # BLD AUTO: 149 X10*3/UL (ref 150–450)
POTASSIUM SERPL-SCNC: 4 MMOL/L (ref 3.5–5.3)
PROT SERPL-MCNC: 6.2 G/DL (ref 6.4–8.2)
PTH-INTACT SERPL-MCNC: 90.7 PG/ML (ref 18.5–88)
RBC # BLD AUTO: 4.36 X10*6/UL (ref 4–5.2)
SODIUM SERPL-SCNC: 141 MMOL/L (ref 136–145)
TRIGL SERPL-MCNC: 78 MG/DL (ref 0–149)
VLDL: 16 MG/DL (ref 0–40)
WBC # BLD AUTO: 5.2 X10*3/UL (ref 4.4–11.3)

## 2024-08-28 PROCEDURE — 83970 ASSAY OF PARATHORMONE: CPT

## 2024-08-28 PROCEDURE — 84100 ASSAY OF PHOSPHORUS: CPT

## 2024-08-28 PROCEDURE — 80053 COMPREHEN METABOLIC PANEL: CPT

## 2024-08-28 PROCEDURE — 80061 LIPID PANEL: CPT

## 2024-08-28 PROCEDURE — 85025 COMPLETE CBC W/AUTO DIFF WBC: CPT

## 2024-08-28 PROCEDURE — 36415 COLL VENOUS BLD VENIPUNCTURE: CPT
